# Patient Record
Sex: FEMALE | Race: WHITE
[De-identification: names, ages, dates, MRNs, and addresses within clinical notes are randomized per-mention and may not be internally consistent; named-entity substitution may affect disease eponyms.]

---

## 2017-01-05 ENCOUNTER — HOSPITAL ENCOUNTER (OUTPATIENT)
Dept: HOSPITAL 47 - LABWHC1 | Age: 22
Discharge: HOME | End: 2017-01-05
Payer: COMMERCIAL

## 2017-01-05 DIAGNOSIS — N91.2: Primary | ICD-10-CM

## 2017-01-05 PROCEDURE — 36415 COLL VENOUS BLD VENIPUNCTURE: CPT

## 2017-01-05 PROCEDURE — 84702 CHORIONIC GONADOTROPIN TEST: CPT

## 2017-07-27 ENCOUNTER — HOSPITAL ENCOUNTER (EMERGENCY)
Dept: HOSPITAL 47 - EC | Age: 22
Discharge: HOME | End: 2017-07-27
Payer: COMMERCIAL

## 2017-07-27 VITALS
DIASTOLIC BLOOD PRESSURE: 81 MMHG | TEMPERATURE: 98.7 F | HEART RATE: 97 BPM | SYSTOLIC BLOOD PRESSURE: 122 MMHG | RESPIRATION RATE: 18 BRPM

## 2017-07-27 DIAGNOSIS — F31.9: ICD-10-CM

## 2017-07-27 DIAGNOSIS — F17.200: ICD-10-CM

## 2017-07-27 DIAGNOSIS — F41.9: ICD-10-CM

## 2017-07-27 DIAGNOSIS — Z79.899: ICD-10-CM

## 2017-07-27 DIAGNOSIS — Z88.8: ICD-10-CM

## 2017-07-27 DIAGNOSIS — K02.9: Primary | ICD-10-CM

## 2017-07-27 PROCEDURE — 99283 EMERGENCY DEPT VISIT LOW MDM: CPT

## 2017-07-27 NOTE — ED
ENT HPI





- General


Chief complaint: Dental/Oral


Stated complaint: DENTAL PAIN


Time Seen by Provider: 07/27/17 18:22


Source: patient, RN notes reviewed, old records reviewed


Mode of arrival: ambulatory


Limitations: no limitations





- History of Present Illness


Initial comments: 





20-year-old female presents the ED chief complaint of bilateral lower dental 

pain and upper dental pain for the past 5 days.  Patient has a history of 

severely poor dentition.  She reports that her family never told her pressure 

teeth.  She does have partial implant in her upper teeth.  She does plan to see 

a dentist but has not had one in the area.  She is a smoker.  She denies any 

fever or chills.  She denies any other associated symptoms.  She reports she 

has been taking Motrin and Tylenol for pain but no relief.





- Related Data


 Home Medications











 Medication  Instructions  Recorded  Confirmed


 


Dextroamphetamine/Amphetamine 20 mg PO DAILY 11/05/16 12/17/16





[Adderall]   


 


Ibuprofen [Motrin] 600 mg PO Q8HR PRN 11/05/16 12/17/16


 


clonazePAM [KlonoPIN] 0.5 mg PO HS 07/27/17 07/27/17








 Previous Rx's











 Medication  Instructions  Recorded


 


Penicillin V Potassium [Pen Vee K] 500 mg PO QID #40 tab 07/27/17


 


traMADol HCl [Ultram] 50 mg PO Q6H PRN #12 tab 07/27/17











 Allergies











Allergy/AdvReac Type Severity Reaction Status Date / Time


 


resperdone AdvReac  Unknown Uncoded 11/02/16 00:14














Review of Systems


ROS Statement: 


Those systems with pertinent positive or pertinent negative responses have been 

documented in the HPI.





ROS Other: All systems not noted in ROS Statement are negative.





Past Medical History


Past Medical History: No Reported History


History of Any Multi-Drug Resistant Organisms: None Reported


Past Surgical History: No Surgical Hx Reported


Additional Past Surgical History / Comment(s): oral


Past Anesthesia/Blood Transfusion Reactions: No Reported Reaction


Past Psychological History: Anxiety, Bipolar


Smoking Status: Current every day smoker


Past Alcohol Use History: Rare


Past Drug Use History: None Reported





General Exam





- General Exam Comments


Initial Comments: 





20-year-old female.  No acute distress.


Limitations: no limitations


General appearance: alert, in no apparent distress


Head exam: Present: atraumatic, normocephalic, normal inspection


Eye exam: Present: normal appearance, PERRL, EOMI.  Absent: scleral icterus, 

conjunctival injection, periorbital swelling


ENT exam: Present: mucous membranes moist.  Absent: normal exam, normal 

oropharynx (Patient has severely poor dentition.  Multiple dental caries 

throughout all the teeth.  She does have an implant for teeth 7 through 10.)


Neck exam: Present: normal inspection.  Absent: tenderness, meningismus, 

lymphadenopathy


Respiratory exam: Present: normal lung sounds bilaterally.  Absent: respiratory 

distress, wheezes, rales, rhonchi, stridor


Cardiovascular Exam: Present: regular rate, normal rhythm, normal heart sounds.

  Absent: systolic murmur, diastolic murmur, rubs, gallop, clicks


GI/Abdominal exam: Present: soft, normal bowel sounds.  Absent: distended, 

tenderness, guarding, rebound, rigid


Extremities exam: Present: normal inspection, full ROM, normal capillary 

refill.  Absent: tenderness, pedal edema, joint swelling, calf tenderness


Back exam: Present: normal inspection


Neurological exam: Present: alert, oriented X3, CN II-XII intact


Psychiatric exam: Present: normal affect, normal mood


Skin exam: Present: warm, dry, intact, normal color.  Absent: rash





Course





 Vital Signs











  07/27/17





  18:21


 


Temperature 98.7 F


 


Pulse Rate 97


 


Respiratory 18





Rate 


 


Blood Pressure 122/81


 


O2 Sat by Pulse 96





Oximetry 














Medical Decision Making





- Medical Decision Making


20-year-old female presents the ED chief complaint of bilateral lower dental 

pain and upper dental pain for the past 5 days.  Patient has a history of 

severely poor dentition.  She reports that her family never told her pressure 

teeth.  She does have partial implant in her upper teeth.  Patient has severely 

poor dentition throughout her entire mouth.  She has an implant on teeth #7 

through 10.  Discussed with patient reports a dental hygienist brushing her 

teeth and flossing.  All her teeth are rotted decayed owing up with her dentist 

and will be given referrals for dentists around here.  Patient agrees to 

treatment plan will comply.  Return parameters were discussed.








Disposition


Clinical Impression: 


 Dental caries, Pain, dental





Disposition: HOME SELF-CARE


Condition: Good


Instructions:  Dental Caries (ED), Toothache (ED)


Additional Instructions: 


Patient advised to follow-up with dental clinic.  Take antibiotics and 

recommended mouth rinses and brushing her teeth.





[facility]





[facility] [place] [address]





[phone]  [phone] (existing clients only)





For new clients: [phone]





1st consult: $50 (includes Xrays)





Usually 30% less then private dentist for visits after. 





[facility]





Have to pay $50 for Xrays anmd rest is covered.





[phone]


Prescriptions: 


Penicillin V Potassium [Pen Vee K] 500 mg PO QID #40 tab


traMADol HCl [Ultram] 50 mg PO Q6H PRN #12 tab


 PRN Reason: Pain


Referrals: 


Florentin Milligan MD [Primary Care Provider] - 1-2 days


Time of Disposition: 18:36

## 2017-09-14 ENCOUNTER — HOSPITAL ENCOUNTER (EMERGENCY)
Dept: HOSPITAL 47 - EC | Age: 22
Discharge: HOME | End: 2017-09-14
Payer: COMMERCIAL

## 2017-09-14 VITALS
RESPIRATION RATE: 16 BRPM | SYSTOLIC BLOOD PRESSURE: 127 MMHG | TEMPERATURE: 96.9 F | DIASTOLIC BLOOD PRESSURE: 77 MMHG | HEART RATE: 102 BPM

## 2017-09-14 DIAGNOSIS — F41.9: ICD-10-CM

## 2017-09-14 DIAGNOSIS — K08.89: Primary | ICD-10-CM

## 2017-09-14 DIAGNOSIS — Z79.899: ICD-10-CM

## 2017-09-14 DIAGNOSIS — K08.409: ICD-10-CM

## 2017-09-14 DIAGNOSIS — Z98.890: ICD-10-CM

## 2017-09-14 DIAGNOSIS — F17.200: ICD-10-CM

## 2017-09-14 DIAGNOSIS — Z88.8: ICD-10-CM

## 2017-09-14 PROCEDURE — 99282 EMERGENCY DEPT VISIT SF MDM: CPT

## 2017-09-14 NOTE — ED
ENT HPI





- General


Chief complaint: Dental/Oral


Stated complaint: Tooth Ache


Time Seen by Provider: 09/14/17 10:17


Source: patient


Mode of arrival: ambulatory


Limitations: no limitations





- History of Present Illness


Initial comments: 





Years old female presented with right upper jaw dental pain and gum pain, it 

started at 10 PM last night she has ongoing dental issues for years she has 

lost multiple teeth and more than half of her teeth have major dental disease.  

She denies any fever no chills no trauma to the affected area at this point.  

Review of system is negative otherwise





- Related Data


 Home Medications











 Medication  Instructions  Recorded  Confirmed


 


Dextroamphetamine/Amphetamine 20 mg PO DAILY 11/05/16 07/27/17





[Adderall]   


 


Ibuprofen [Motrin] 600 mg PO Q8HR PRN 11/05/16 07/27/17


 


clonazePAM [KlonoPIN] 0.5 mg PO HS 07/27/17 07/27/17








 Previous Rx's











 Medication  Instructions  Recorded


 


Penicillin V Potassium [Pen Vee K] 500 mg PO QID #40 tab 07/27/17


 


traMADol HCl [Ultram] 50 mg PO Q6H PRN #12 tab 07/27/17


 


Acetaminophen-Codeine 300-30mg 2 tab PO Q8H PRN #30 tablet 09/14/17





[Tylenol #3]  


 


Amoxicillin 500 mg PO Q8H #30 capsule 09/14/17











 Allergies











Allergy/AdvReac Type Severity Reaction Status Date / Time


 


resperdone AdvReac  Unknown Uncoded 09/14/17 10:08














Review of Systems


ROS Statement: 


Those systems with pertinent positive or pertinent negative responses have been 

documented in the HPI.





ROS Other: All systems not noted in ROS Statement are negative.





Past Medical History


Past Medical History: No Reported History


History of Any Multi-Drug Resistant Organisms: None Reported


Past Surgical History: No Surgical Hx Reported


Additional Past Surgical History / Comment(s): oral


Past Anesthesia/Blood Transfusion Reactions: No Reported Reaction


Past Psychological History: Anxiety, Bipolar


Smoking Status: Current every day smoker


Past Alcohol Use History: Rare


Past Drug Use History: None Reported





General Exam





- General Exam Comments


Initial Comments: 





General:  The patient is awake and alert, in no distress, and does not appear 

acutely ill. 


Skin:  Skin is warm and dry and no rashes or lesions are noted. 


Eye:  Pupils are equal, round and reactive to light, extra-ocular movements are 

intact; there is normal conjunctiva bilaterally.  


Ears, nose, mouth and throat:  Examination of the gums and no noticed or 

inflammation of the gums at tooth #1 and 2 his right upper jaw in a more area, 

she is missing tooth 789 and 10 


Neck:  The neck is supple, there is no tenderness  or JVD.  


Cardiovascular:  There is a regular rate and rhythm. No murmur, rub or gallop 

is appreciated.


Respiratory: To auscultation bilateral, no wheezing no rhonchi no distress  

respiratory wise noticed


Gastrointestinal:  Soft, non-distended, non-tender abdomen without masses or 

organomegaly noted. There is no rebound or guarding present. Bowel sounds are 

unremarkable. 


Back:  There is no tenderness to palpation in the midline. There is no obvious 

deformity.


Musculoskeletal:  Normal ROM, no tenderness, There is no pedal edema. There is 

no calf tenderness or swelling. No cords were appreciated.  


Neurological:  CN II-XII intact, Cranial nerves III through XII are intact. 

There are no obvious motor or sensory deficits. Coordination appears grossly 

intact. Speech is normal.


Psychiatric:  Cooperative, appropriate mood & affect, normal judgment.  





Limitations: no limitations





Course





 Vital Signs











  09/14/17





  10:05


 


Temperature 96.9 F L


 


Pulse Rate 102 H


 


Respiratory 16





Rate 


 


Blood Pressure 127/77


 


O2 Sat by Pulse 100





Oximetry 














Disposition


Clinical Impression: 


 Pain, dental





Disposition: HOME SELF-CARE


Condition: Good


Instructions:  Toothache (ED)


Additional Instructions: 


She is advised to see dentist as soon as possible


Prescriptions: 


Acetaminophen-Codeine 300-30mg [Tylenol #3] 2 tab PO Q8H PRN #30 tablet


 PRN Reason: Pain


Amoxicillin 500 mg PO Q8H #30 capsule


Referrals: 


Florentin Milligan MD [Primary Care Provider] - 1-2 days

## 2017-10-02 ENCOUNTER — HOSPITAL ENCOUNTER (OUTPATIENT)
Dept: HOSPITAL 47 - RADXRMAIN | Age: 22
Discharge: HOME | End: 2017-10-02
Payer: COMMERCIAL

## 2017-10-02 DIAGNOSIS — M79.641: Primary | ICD-10-CM

## 2017-10-02 NOTE — XR
EXAMINATION TYPE: XR hand complete RT

 

DATE OF EXAM: 10/2/2017

 

COMPARISON: NONE

 

HISTORY: Pain

 

TECHNIQUE: Three views are submitted.

 

FINDINGS:

The osseous structures are intact.  The joint spaces are preserved and there is no acute fracture or 
dislocation.  

 

IMPRESSION:

1.  No definite acute fracture or dislocation if symptoms persist, follow-up study in 7 to 10 days wo
uld be suggested

## 2018-02-10 ENCOUNTER — HOSPITAL ENCOUNTER (EMERGENCY)
Dept: HOSPITAL 47 - EC | Age: 23
Discharge: HOME | End: 2018-02-10
Payer: COMMERCIAL

## 2018-02-10 VITALS — DIASTOLIC BLOOD PRESSURE: 70 MMHG | HEART RATE: 92 BPM | RESPIRATION RATE: 18 BRPM | SYSTOLIC BLOOD PRESSURE: 120 MMHG

## 2018-02-10 VITALS — TEMPERATURE: 98.1 F

## 2018-02-10 DIAGNOSIS — Z3A.00: ICD-10-CM

## 2018-02-10 DIAGNOSIS — F41.9: ICD-10-CM

## 2018-02-10 DIAGNOSIS — K02.9: ICD-10-CM

## 2018-02-10 DIAGNOSIS — F17.200: ICD-10-CM

## 2018-02-10 DIAGNOSIS — O99.613: Primary | ICD-10-CM

## 2018-02-10 DIAGNOSIS — K04.7: ICD-10-CM

## 2018-02-10 DIAGNOSIS — Z79.899: ICD-10-CM

## 2018-02-10 DIAGNOSIS — O99.343: ICD-10-CM

## 2018-02-10 DIAGNOSIS — Z88.8: ICD-10-CM

## 2018-02-10 DIAGNOSIS — Z79.82: ICD-10-CM

## 2018-02-10 DIAGNOSIS — O99.333: ICD-10-CM

## 2018-02-10 PROCEDURE — 99283 EMERGENCY DEPT VISIT LOW MDM: CPT

## 2018-02-10 NOTE — ED
ENT HPI





- General


Chief complaint: Dental/Oral


Stated complaint: Tooth Ache/Facial Swelling


Time Seen by Provider: 02/10/18 13:23


Source: patient


Mode of arrival: ambulatory


Limitations: no limitations





- History of Present Illness


Initial comments: 


23-year-old female patient presents to the emergency department today for 

complaints of right upper dental pain.  Patient states that she has had dental 

pain with the last 3 days.  States that today she started have swelling to the 

right side of her face.  Patient states that she has many cavities and broken 

teeth to the area.  States that she needs to have her teeth removed however she 

is pregnant and they will not remove them.  She states she has been taking 

Tylenol for the pain.  She denies any fevers or chills.  Denies any nausea or 

vomiting.  Denies any difficulty swallowing. Patient denies any recent rash, 

shortness breath, chest pain, abdominal pain, diarrhea, constipation, back pain

, numbness, tingling, dizziness, weakness, hematuria, dysuria, urinary urgency, 

urinary frequency, headache, visual changes, or any other complaints.  Patient 

states she is 7 months pregnant and has been feeling normal fetal movement.








- Related Data


 Home Medications











 Medication  Instructions  Recorded  Confirmed


 


Dextroamphetamine/Amphetamine 20 mg PO DAILY 11/05/16 09/14/17





[Adderall]   


 


Ibuprofen [Motrin] 600 mg PO Q8HR PRN 11/05/16 09/14/17


 


clonazePAM [KlonoPIN] 0.5 mg PO HS 07/27/17 09/14/17


 


Anti-Depressant Unknown 1 tab PO DAILY 09/14/17 09/14/17


 


Aspirin 650 mg PO DAILY 09/14/17 09/14/17








 Previous Rx's











 Medication  Instructions  Recorded


 


Acetaminophen-Codeine 300-30mg 2 tab PO Q8H PRN #30 tablet 09/14/17





[Tylenol #3]  


 


Amoxicillin 500 mg PO Q8H #30 capsule 09/14/17


 


Penicillin V Potassium [Pen Vee K] 500 mg PO Q6H #40 tablet 02/10/18











 Allergies











Allergy/AdvReac Type Severity Reaction Status Date / Time


 


risperidone Allergy  Unknown Verified 02/10/18 13:09














Review of Systems


ROS Statement: 


Those systems with pertinent positive or pertinent negative responses have been 

documented in the HPI.





ROS Other: All systems not noted in ROS Statement are negative.





Past Medical History


Past Medical History: No Reported History


History of Any Multi-Drug Resistant Organisms: None Reported


Past Surgical History: No Surgical Hx Reported


Additional Past Surgical History / Comment(s): oral


Past Anesthesia/Blood Transfusion Reactions: No Reported Reaction


Past Psychological History: Anxiety, Bipolar


Smoking Status: Current every day smoker


Past Alcohol Use History: Rare


Past Drug Use History: None Reported





General Exam


Limitations: no limitations


General appearance: alert, in no apparent distress, other (This is a well-

developed, well-nourished adult female patient in no acute distress.  Vital 

signs upon presentation are temperature 98.1F, pulse 109, respirations 20, 

blood pressure 118/78, pulse ox 98% on room air.)


Eye exam: Present: normal appearance, PERRL, EOMI.  Absent: scleral icterus, 

conjunctival injection, periorbital swelling


ENT exam: Present: mucous membranes moist, other (Dentition is very poor with 

multiple cavities, right upper teeth are broken and did show extensive dental 

caries.  There is surrounding gingival erythema, right facial swelling.  No 

evidence of drainable abscess.  No cervical lymphadenopathy.).  Absent: normal 

exam


Neck exam: Present: normal inspection.  Absent: tenderness, meningismus, 

lymphadenopathy


Respiratory exam: Present: normal lung sounds bilaterally.  Absent: respiratory 

distress, wheezes, rales, rhonchi, stridor


Cardiovascular Exam: Present: regular rate, normal rhythm, normal heart sounds.

  Absent: systolic murmur, diastolic murmur, rubs, gallop, clicks





Course


 Vital Signs











  02/10/18





  13:06


 


Temperature 98.1 F


 


Pulse Rate 109 H


 


Respiratory 20





Rate 


 


Blood Pressure 118/78


 


O2 Sat by Pulse 98





Oximetry 














Medical Decision Making





- Medical Decision Making


23-year-old female patient percents to the emergency department today with 

complaint of right upper dental pain and facial swelling.  Physical examination 

did reveal extensive dental caries with gingival erythema.  No evidence of 

drainable abscess.  Patient is 7 months pregnant.  Fetal heart tones were 157, 

performed by myself.  Vital signs are stable.  Patient be discharged with a 

prescription for penicillin.  She is instructed to take Tylenol for pain 

control.  She is instructed to do saltwater gargles and swishes.  She is 

instructed to apply warm compresses to the outside of her face.  She is 

instructed to follow-up with dentistry as soon as possible.  She is instructed 

to return here immediately for any new, worsening, or concerning symptoms.  She 

verbalizes understanding and agrees with this plan.








Disposition


Clinical Impression: 


 Dental abscess





Disposition: HOME SELF-CARE


Condition: Good


Instructions:  Dental Abscess (ED), Toothache (ED)


Additional Instructions: 


Swish with warm salt water.  Use warm compresses to the outside of the face.  

Complete antibiotic prescription in full.  Follow-up with the dentist as soon 

as possible.  Return here immediately for any new, worsening, or concerning 

symptoms.


Prescriptions: 


Penicillin V Potassium [Pen Vee K] 500 mg PO Q6H #40 tablet


Referrals: 


None,Stated [Primary Care Provider] - 1-2 days


Time of Disposition: 13:25

## 2018-02-21 ENCOUNTER — HOSPITAL ENCOUNTER (OUTPATIENT)
Dept: HOSPITAL 47 - LABWHC1 | Age: 23
Discharge: HOME | End: 2018-02-21
Payer: COMMERCIAL

## 2018-02-21 DIAGNOSIS — Z34.82: Primary | ICD-10-CM

## 2018-02-21 DIAGNOSIS — Z3A.00: ICD-10-CM

## 2018-02-21 LAB
ERYTHROCYTE [DISTWIDTH] IN BLOOD BY AUTOMATED COUNT: 3.49 M/UL (ref 3.8–5.4)
ERYTHROCYTE [DISTWIDTH] IN BLOOD: 12.1 % (ref 11.5–15.5)
HCT VFR BLD AUTO: 34.2 % (ref 34–46)
HGB BLD-MCNC: 10.7 GM/DL (ref 11.4–16)
MCH RBC QN AUTO: 30.6 PG (ref 25–35)
MCHC RBC AUTO-ENTMCNC: 31.2 G/DL (ref 31–37)
MCV RBC AUTO: 98.1 FL (ref 80–100)
PLATELET # BLD AUTO: 265 K/UL (ref 150–450)
WBC # BLD AUTO: 17.7 K/UL (ref 3.8–10.6)

## 2018-02-21 PROCEDURE — 82950 GLUCOSE TEST: CPT

## 2018-02-21 PROCEDURE — 36415 COLL VENOUS BLD VENIPUNCTURE: CPT

## 2018-02-21 PROCEDURE — 85027 COMPLETE CBC AUTOMATED: CPT

## 2018-03-14 ENCOUNTER — HOSPITAL ENCOUNTER (OUTPATIENT)
Dept: HOSPITAL 47 - FBPOP | Age: 23
Discharge: HOME | End: 2018-03-14
Payer: COMMERCIAL

## 2018-03-14 VITALS
DIASTOLIC BLOOD PRESSURE: 74 MMHG | RESPIRATION RATE: 16 BRPM | TEMPERATURE: 97.8 F | SYSTOLIC BLOOD PRESSURE: 129 MMHG | HEART RATE: 96 BPM

## 2018-03-14 DIAGNOSIS — Z3A.32: ICD-10-CM

## 2018-03-14 DIAGNOSIS — O26.93: Primary | ICD-10-CM

## 2018-03-14 DIAGNOSIS — O99.333: ICD-10-CM

## 2018-03-14 LAB
PH UR: 6.5 [PH] (ref 5–8)
RBC UR QL: 1 /HPF (ref 0–5)
SP GR UR: 1.02 (ref 1–1.03)
SQUAMOUS UR QL AUTO: 2 /HPF (ref 0–4)
UROBILINOGEN UR QL STRIP: 3 MG/DL (ref ?–2)
WBC #/AREA URNS HPF: 3 /HPF (ref 0–5)

## 2018-03-14 PROCEDURE — 59025 FETAL NON-STRESS TEST: CPT

## 2018-03-14 PROCEDURE — 99213 OFFICE O/P EST LOW 20 MIN: CPT

## 2018-03-14 PROCEDURE — 81001 URINALYSIS AUTO W/SCOPE: CPT

## 2018-03-14 NOTE — P.MSEPDOC
Presenting Problems





- Arrival Data


Date of Arrival on Unit: 18


Time of Arrival on Unit: 15:00


Mode of Transport: Ambulatory





- Complaint


OB-Reason for Admission/Chief Complaint: Pain


Comment: Suprapubic and flank pain





Prenatal Medical History





- Pregnancy Information


: 2


Para: 1


Term: 1


: 0


Abortions: Spontaneous or Elective: 0


Number of Living Children: 1





- Gestational Age


Gestational Age by MONIQUE (wks/days): 32 Weeks and 5 Days





- Prenatal History


Pregnancy Complications: Smoker





Review of Systems





- Review of Systems


Constitutional: No problems


Breast: No problems


ENT: No problems


Cardiovascular: No problems


Respiratory: No problems


Gastrointestinal: No problems


Genitourinary: Increased frequency


Musculoskeletal: No problems


Neurological: No problems


Skin: No problems





Vital Signs





- Temperature


Temperature: 97.8 F


Temperature Source: Temporal Artery Scan





- Pulse


  ** Right Sitting Brachial


Pulse Rate: 96


Pulse Assessment Method: Pulse Oximetry





- Respirations


Respiratory Rate: 16


Oxygen Delivery Method: Room Air


O2 Sat by Pulse Oximetry: 98





- Blood Pressure


  ** Right Arm Sitting


Blood Pressure: 129/74


Blood Pressure Mean: 92


Blood Pressure Source: Automatic Cuff





Medical Screen Scoring (Pre)





- Cervical Exam


Dilation: 0 cm = 0


Effacement: Exam Deferred


Membranes: Intact





- Uterine Contractions


Frequency: N/A


Duration: N/A


Intensity: N/A





- Maternal Vital Signs


Maternal Temperature: N/A


Maternal Blood Pressure: N/A


Signs of Preeclampsia: N/A


Maternal Respirations: N/A





- Pain Assessment


Pain Location and Character: Lower, Abdomen


Pain Scale Used: Numeric (1 - 10)


Pain Intensity: 8


Pain Management Goal: 2


Pain Description: *Acute, Cramping


Pain Radiation Location: flank


Pain Frequency: Constant


Pain Duration: 48


Pain Duration Units: Hours


Pain Behavior: Vocalization


Pain Aggravating Factors: Activity





- Maternal Trauma


Maternal Trauma: N/A





- Fetal Assessment


Baseline FHR: 130


Fetal Heart Rate - NICHD Category: Category I (Normal) = 0


NST: Reactive


Fetal Position: N/A





- Total Score


Total Score (Pre): 0





- Level of Risk


Level of Risk: Low (0-5)





Physician Notification (Pre)





- Physician Notified


Physician Notified Date: 18


Physician Notified Time: 16:05


Physician/Practitioner Notifed:: Haroon


Spoke With: Haroon


New Order Received: Yes





- Notification Comment


Comment: Spk c\Dr. Patiño, advsd , 32 /, reactive NST, UA results, FFN 

collected, SVE closed/50/-2, firm. Not tacho. Order to edu pt on normal 

pregnancy discomfort, comfort measures. D/C home, to follow up at next 

scheduled appt.





Disposition





- Disposition


OB Disposition: Discharge to home, Written follow up instructions reviewed


Discharge Date: 18


Discharge Time: 16:17


I agree with the RN Medical Screening Exam: Yes


Risk & Benefit of care provided described in d/c instruction: Yes


Diagnosis: PREGNANCY RELATED CONDITIONS, UNSPECIFIED, THIRD TRIMESTER (pain 

consistent with fetal movement)

## 2018-03-16 ENCOUNTER — HOSPITAL ENCOUNTER (OUTPATIENT)
Dept: HOSPITAL 47 - FBPOP | Age: 23
Discharge: HOME | End: 2018-03-16
Payer: COMMERCIAL

## 2018-03-16 VITALS
HEART RATE: 92 BPM | SYSTOLIC BLOOD PRESSURE: 121 MMHG | TEMPERATURE: 97.8 F | RESPIRATION RATE: 16 BRPM | DIASTOLIC BLOOD PRESSURE: 74 MMHG

## 2018-03-16 DIAGNOSIS — M25.552: ICD-10-CM

## 2018-03-16 DIAGNOSIS — O26.893: Primary | ICD-10-CM

## 2018-03-16 DIAGNOSIS — Z3A.33: ICD-10-CM

## 2018-03-16 PROCEDURE — 99213 OFFICE O/P EST LOW 20 MIN: CPT

## 2018-03-16 PROCEDURE — 59025 FETAL NON-STRESS TEST: CPT

## 2018-03-20 NOTE — P.MSEPDOC
Presenting Problems





- Arrival Data


Date of Arrival on Unit: 18


Time of Arrival on Unit: 18:57


Mode of Transport: Wheelchair





- Complaint


OB-Reason for Admission/Chief Complaint: Pain


Comment: pt states cramping throughout the last few days. states it became more 

painful today. c/o hip pain as well.





Prenatal Medical History





- Pregnancy Information


: 2


Para: 1


Term: 1


: 0


Abortions: Spontaneous or Elective: 0


Number of Living Children: 1





- Gestational Age


Gestational Age by MONIQUE (wks/days): 33 Weeks and 0 Days





Review of Systems





- Review of Systems


Constitutional: No problems


Breast: No problems


ENT: No problems


Cardiovascular: No problems


Respiratory: No problems


Gastrointestinal: No problems


Genitourinary: No problems


Musculoskeletal: No problems


Neurological: No problems


Skin: No problems





Vital Signs





- Temperature


Temperature: 97.8 F


Temperature Source: Oral





- Pulse


  ** Right Sitting Brachial


Pulse Rate: 92


Pulse Assessment Method: Automatic Cuff





- Respirations


Respiratory Rate: 16


Oxygen Delivery Method: Room Air


O2 Sat by Pulse Oximetry: 98





- Blood Pressure


  ** Right Arm Sitting


Blood Pressure: 121/74


Blood Pressure Mean: 89


Blood Pressure Source: Automatic Cuff





Medical Screen Scoring (Pre)





- Cervical Exam


Dilation: 0 cm = 0


Membranes: Intact





- Maternal Vital Signs


Maternal Blood Pressure: N/A


Signs of Preeclampsia: N/A


Maternal Respirations: N/A





- Pain Assessment


Pain Location and Character: Medial, Abdomen


Pain Scale Used: Numeric (1 - 10)


Pain Intensity: 9


Pain Management Goal: 3


Pain Description: Cramping


Pain Radiation Location: N/A


Pain Frequency: Intermittent


Pain Duration: 3


Pain Duration Units: Days


Pain Behavior: Vocalization


Pain Aggravating Factors: Activity, Contractions





- Fetal Assessment


Baseline FHR: 125


Fetal Heart Rate - NICHD Category: Category I (Normal) = 0


NST: Reactive





- Total Score


Total Score (Pre): 0





- Level of Risk


Level of Risk: Low (0-5)





Physician Notification (Pre)





- Physician Notified


Physician Notified Date: 18


Physician Notified Time: 19:50


Physician/Practitioner Notifed:: SVITLANA


Spoke With: SVITLANA


New Order Received: Yes





- Notification Comment


Comment: collect FFN, check cervix, if closed, d/c home. If dilated, send FFN





Disposition





- Disposition


OB Disposition: Discharge to home


Discharge Date: 18


Discharge Time: 20:10


I agree with the RN Medical Screening Exam: Yes


Risk & Benefit of care provided described in d/c instruction: Yes


Diagnosis: PAIN IN LEFT HIP (pregnancy related)

## 2018-04-02 ENCOUNTER — HOSPITAL ENCOUNTER (OUTPATIENT)
Dept: HOSPITAL 47 - FBPOP | Age: 23
Discharge: HOME | End: 2018-04-02
Payer: COMMERCIAL

## 2018-04-02 VITALS
DIASTOLIC BLOOD PRESSURE: 73 MMHG | TEMPERATURE: 98 F | HEART RATE: 96 BPM | SYSTOLIC BLOOD PRESSURE: 115 MMHG | RESPIRATION RATE: 16 BRPM

## 2018-04-02 DIAGNOSIS — R10.9: ICD-10-CM

## 2018-04-02 DIAGNOSIS — Z3A.35: ICD-10-CM

## 2018-04-02 DIAGNOSIS — O99.89: Primary | ICD-10-CM

## 2018-04-02 PROCEDURE — 59025 FETAL NON-STRESS TEST: CPT

## 2018-04-02 PROCEDURE — 84112 EVAL AMNIOTIC FLUID PROTEIN: CPT

## 2018-04-02 PROCEDURE — 99213 OFFICE O/P EST LOW 20 MIN: CPT

## 2018-04-02 NOTE — P.MSEPDOC
Presenting Problems





- Arrival Data


Date of Arrival on Unit: 18


Time of Arrival on Unit: 19:10


Mode of Transport: Ambulatory





- Complaint


OB-Reason for Admission/Chief Complaint: Rule Out SROM, Other


Comment: leaking of clear/yellow fluid since yesterday, abdominal cramping for 

two days, chest heaviness for a week.





Prenatal Medical History





- Pregnancy Information


: 2


Para: 1


Term: 1


: 0


Abortions: Spontaneous or Elective: 0


Number of Living Children: 1





- Gestational Age


Gestational Age by MONIQUE (wks/days): 35 Weeks and 3 Days





- Prenatal History


Pregnancy Complications: Prior 





Review of Systems





- Review of Systems


Constitutional: No problems


Breast: No problems


ENT: No problems


Cardiovascular: Chest pain


Respiratory: No problems


Gastrointestinal: No problems


Genitourinary: No problems


Musculoskeletal: No problems


Neurological: No problems


Skin: No problems





Vital Signs





- Temperature


Temperature: 98.0 F


Temperature Source: Temporal Artery Scan





- Pulse


  ** Right Sitting Brachial


Pulse Rate: 96


Pulse Assessment Method: Automatic Cuff





- Respirations


Respiratory Rate: 16


Oxygen Delivery Method: Room Air


O2 Sat by Pulse Oximetry: 97





- Blood Pressure


  ** Right Arm Sitting


Blood Pressure: 115/73


Blood Pressure Mean: 87


Blood Pressure Source: Automatic Cuff





Medical Screen Scoring (Pre)





- Cervical Exam


Dilation: 0 cm = 0


Membranes: Intact





- Uterine Contractions


Frequency: N/A


Duration: N/A


Intensity: N/A





- Maternal Vital Signs


Maternal Temperature: N/A


Maternal Blood Pressure: N/A


Signs of Preeclampsia: N/A


Maternal Respirations: N/A





- Pain Assessment


Pain Location and Character: Lower, Abdomen


Pain Scale Used: Numeric (1 - 10)


Pain Intensity: 8


Pain Management Goal: 3


Pain Description: Cramping


Pain Radiation Location: n/a


Pain Frequency: Intermittent


Pain Duration: 2


Pain Duration Units: Days


Pain Behavior: None Exhibited, Vocalization


Pain Aggravating Factors: Activity





- Fetal Assessment


Baseline FHR: 130


Fetal Heart Rate - NICHD Category: Category I (Normal) = 0


NST: Reactive


Fetal Position: N/A


Fetal Station: N/A





- Total Score


Total Score (Pre): 0





- Level of Risk


Level of Risk: Low (0-5)





Physician Notification (Pre)





- Physician Notified


Physician Notified Date: 18


Physician/Practitioner Notifed:: noble


Spoke With: noble





Physician Notification (Post)





- Physician Notified


Physician Notified Date: 18


Physician Notified Time: 19:47


Physician/Practitioner Notified:: noble


Spoke With: noble


New Order Received: Yes





- Notification Comment


Comment: d/c pt home.





Disposition





- Disposition


OB Disposition: Discharge to home


Discharge Date: 18


Discharge Time: 19:51


I agree with the RN Medical Screening Exam: Yes


Risk & Benefit of care provided described in d/c instruction: Yes


Diagnosis: PAIN, UNSPECIFIED

## 2018-04-04 ENCOUNTER — HOSPITAL ENCOUNTER (EMERGENCY)
Dept: HOSPITAL 47 - EC | Age: 23
Discharge: HOME | End: 2018-04-04
Payer: COMMERCIAL

## 2018-04-04 VITALS
RESPIRATION RATE: 18 BRPM | SYSTOLIC BLOOD PRESSURE: 127 MMHG | TEMPERATURE: 98 F | HEART RATE: 69 BPM | DIASTOLIC BLOOD PRESSURE: 83 MMHG

## 2018-04-04 DIAGNOSIS — Z88.8: ICD-10-CM

## 2018-04-04 DIAGNOSIS — F17.200: ICD-10-CM

## 2018-04-04 DIAGNOSIS — Z3A.36: ICD-10-CM

## 2018-04-04 DIAGNOSIS — O99.613: Primary | ICD-10-CM

## 2018-04-04 DIAGNOSIS — O99.333: ICD-10-CM

## 2018-04-04 DIAGNOSIS — K04.7: ICD-10-CM

## 2018-04-04 PROCEDURE — 99282 EMERGENCY DEPT VISIT SF MDM: CPT

## 2018-04-04 NOTE — ED
General Adult HPI





- General


Chief complaint: Dental/Oral


Stated complaint: Toothache


Time Seen by Provider: 04/04/18 18:18


Source: patient, RN notes reviewed


Mode of arrival: ambulatory


Limitations: no limitations





- History of Present Illness


Initial comments: 





Patient 23-year-old female presented to the emergency room today with chief 

complaint of increased dental pain.  Patient does not that she's 36 weeks 

pregnant.  She denies any abdominal pain, vaginal bleeding or discharge.  

Patient states that she noticed some pain to the right upper gumline started 

yesterday.  She does admit some pain locally around tooth #7.  Patient denies 

any other complaints or symptoms at this time. Patient denies any recent fever, 

chills, shortness of breath, chest pain, back pain, abdominal pain, headaches 

or visual changes, or any other complaints.





- Related Data


 Home Medications











 Medication  Instructions  Recorded  Confirmed


 


Pnv,Calcium 72/Iron/Folic Acid 1 tab PO DAILY 03/14/18 04/02/18





[Prenatal Plus Tablet]   








 Previous Rx's











 Medication  Instructions  Recorded


 


Penicillin V Potassium [Pen Vee K] 500 mg PO QID #40 tablet 04/04/18











 Allergies











Allergy/AdvReac Type Severity Reaction Status Date / Time


 


risperidone Allergy  Unknown Verified 04/04/18 18:17














Review of Systems


ROS Statement: 


Those systems with pertinent positive or pertinent negative responses have been 

documented in the HPI.





ROS Other: All systems not noted in ROS Statement are negative.





Past Medical History


Past Medical History: No Reported History


History of Any Multi-Drug Resistant Organisms: None Reported


Past Surgical History: No Surgical Hx Reported


Additional Past Surgical History / Comment(s): oral


Past Anesthesia/Blood Transfusion Reactions: No Reported Reaction


Past Psychological History: Anxiety, Bipolar


Smoking Status: Current every day smoker





General Exam





- General Exam Comments


Initial Comments: 





General:  The patient is awake and alert, in no distress, and does not appear 

acutely ill. 


Eye:  Pupils are equal, round and reactive to light, extra-ocular movements are 

intact.  No nystagmus.  There is normal conjunctiva bilaterally.  No signs of 

icterus.  


Ears, nose, mouth and throat:  There are moist mucous membranes and no oral 

lesions.  She does have missing tooth #8 #9.  She is locally tender in the 

gumline above tooth #7.  No sign of abscess.  Some swelling to the gumline.  No 

drainage or discharge.


Neck:  The neck is supple, there is no tenderness or JVD.  


Musculoskeletal:  Normal ROM, no tenderness.  Strength 5/5. Sensation intact. 

Pulses equal bilaterally 2+.  


Neurological:  A&O x 3. CN II-XII intact, There are no obvious motor or sensory 

deficits. Coordination appears grossly intact. Speech is normal.


Skin:  Skin is warm and dry and no rashes or lesions are noted. 


Psychiatric:  Cooperative, appropriate mood & affect, normal judgment.  


Limitations: no limitations





Course





 Vital Signs











  04/04/18





  18:15


 


Temperature 98 F


 


Pulse Rate 69


 


Respiratory 18





Rate 


 


Blood Pressure 127/83


 


O2 Sat by Pulse 98





Oximetry 














Medical Decision Making





- Medical Decision Making





She'll be started on antibiotics of penicillin.  Patient is advised follow-up 

with her dentist.  Advised return if any symptoms increase worsen.  





Disposition


Clinical Impression: 


 Dental abscess





Disposition: HOME SELF-CARE


Condition: Good


Instructions:  Dental Abscess (ED)


Additional Instructions: 


Please use medication as discussed.  Please follow-up with dentist/family 

doctor in the next 2 days of symptoms have not improved.  Please return to 

emergency room if the symptoms increase or worsen or for any other concerns.


Prescriptions: 


Penicillin V Potassium [Pen Vee K] 500 mg PO QID #40 tablet


Referrals: 


None,Stated [Primary Care Provider] - 1-2 days


Time of Disposition: 18:27

## 2018-04-05 NOTE — CDI
Documentation Clarification OP

Dear Jadon Garza PA-C

Please provide proper documentation about pregnancy.

Thank you,

CAREY Alex 

 

If you have any questions, please contact  at [phone] 
[facility]

## 2018-04-12 ENCOUNTER — HOSPITAL ENCOUNTER (OUTPATIENT)
Dept: HOSPITAL 47 - FBPOP | Age: 23
Discharge: HOME | End: 2018-04-12
Payer: COMMERCIAL

## 2018-04-12 VITALS
HEART RATE: 121 BPM | RESPIRATION RATE: 18 BRPM | TEMPERATURE: 97.9 F | DIASTOLIC BLOOD PRESSURE: 80 MMHG | SYSTOLIC BLOOD PRESSURE: 126 MMHG

## 2018-04-12 DIAGNOSIS — O47.03: Primary | ICD-10-CM

## 2018-04-12 DIAGNOSIS — Z3A.36: ICD-10-CM

## 2018-04-12 PROCEDURE — 99213 OFFICE O/P EST LOW 20 MIN: CPT

## 2018-04-12 PROCEDURE — 59025 FETAL NON-STRESS TEST: CPT

## 2018-04-16 NOTE — P.MSEPDOC
Presenting Problems





- Arrival Data


Date of Arrival on Unit: 18


Time of Arrival on Unit: 14:55


Mode of Transport: Wheelchair





- Complaint


OB-Reason for Admission/Chief Complaint: Possible Onset of Labor





Prenatal Medical History





- Pregnancy Information


: 2


Para: 1


Term: 1


: 0


Abortions: Spontaneous or Elective: 0


Number of Living Children: 1





- Gestational Age


Gestational Age by MONIQUE (wks/days): 36 Weeks and 6 Days





- Prenatal History


Pregnancy Complications: Prior 





Review of Systems





- Review of Systems


Constitutional: No problems


Breast: No problems


ENT: No problems


Cardiovascular: No problems


Respiratory: No problems


Gastrointestinal: No problems


Genitourinary: No problems


Musculoskeletal: No problems


Neurological: No problems


Skin: No problems





Vital Signs





- Temperature


Temperature: 97.9 F


Temperature Source: Temporal Artery Scan





- Pulse


  ** Brachial


Pulse Rate: 121


Pulse Assessment Method: Automatic Cuff





- Respirations


Respiratory Rate: 18


Oxygen Delivery Method: Room Air





- Blood Pressure


  ** Right Arm


Blood Pressure: 126/80


Blood Pressure Mean: 95


Blood Pressure Source: Automatic Cuff





Medical Screen Scoring (Pre)





- Cervical Exam


Dilation: 0 cm = 0


Membranes: Intact





- Uterine Contractions


Frequency: N/A





- Maternal Vital Signs


Maternal Temperature: N/A


Signs of Preeclampsia: N/A


Maternal Respirations: N/A





- Pain Assessment


Pain Location and Character: Abdomen


Pain Scale Used: Numeric (1 - 10)


Pain Intensity: 9


Pain Description: Cramping


Pain Frequency: Intermittent


Pain Behavior: Vocalization





- Fetal Assessment


Baseline FHR: 135


Fetal Heart Rate - NICHD Category: Category I (Normal) = 0


NST: Reactive





- Total Score


Total Score (Pre): 0





Physician Notification (Pre)





- Physician Notified


Physician Notified Date: 18


Physician Notified Time: 16:05


Physician/Practitioner Notifed:: dr candelaria





Disposition





- Disposition


OB Disposition: Triage, Discharge to home, Written follow up instructions 

reviewed


Discharge Date: 18


Discharge Time: 16:25


I agree with the RN Medical Screening Exam: Yes


Risk & Benefit of care provided described in d/c instruction: Yes


Diagnosis: FALSE LABOR BEFORE 37 COMPLETED WEEKS OF GEST, THIRD TRI

## 2018-04-30 ENCOUNTER — HOSPITAL ENCOUNTER (INPATIENT)
Dept: HOSPITAL 47 - 4FBP | Age: 23
LOS: 2 days | Discharge: HOME | End: 2018-05-02
Payer: COMMERCIAL

## 2018-04-30 VITALS — BODY MASS INDEX: 26.9 KG/M2

## 2018-04-30 DIAGNOSIS — Z88.8: ICD-10-CM

## 2018-04-30 DIAGNOSIS — Z3A.39: ICD-10-CM

## 2018-04-30 DIAGNOSIS — F17.200: ICD-10-CM

## 2018-04-30 DIAGNOSIS — O34.211: Primary | ICD-10-CM

## 2018-04-30 LAB
BASOPHILS # BLD AUTO: 0 K/UL (ref 0–0.2)
BASOPHILS NFR BLD AUTO: 0 %
EOSINOPHIL # BLD AUTO: 0.3 K/UL (ref 0–0.7)
EOSINOPHIL NFR BLD AUTO: 2 %
ERYTHROCYTE [DISTWIDTH] IN BLOOD BY AUTOMATED COUNT: 3.91 M/UL (ref 3.8–5.4)
ERYTHROCYTE [DISTWIDTH] IN BLOOD: 12.1 % (ref 11.5–15.5)
HCT VFR BLD AUTO: 36.3 % (ref 34–46)
HGB BLD-MCNC: 12.3 GM/DL (ref 11.4–16)
LYMPHOCYTES # SPEC AUTO: 3.1 K/UL (ref 1–4.8)
LYMPHOCYTES NFR SPEC AUTO: 24 %
MCH RBC QN AUTO: 31.6 PG (ref 25–35)
MCHC RBC AUTO-ENTMCNC: 34 G/DL (ref 31–37)
MCV RBC AUTO: 92.9 FL (ref 80–100)
MONOCYTES # BLD AUTO: 0.4 K/UL (ref 0–1)
MONOCYTES NFR BLD AUTO: 3 %
NEUTROPHILS # BLD AUTO: 9.1 K/UL (ref 1.3–7.7)
NEUTROPHILS NFR BLD AUTO: 70 %
PLATELET # BLD AUTO: 228 K/UL (ref 150–450)
WBC # BLD AUTO: 13.1 K/UL (ref 3.8–10.6)

## 2018-04-30 PROCEDURE — 88307 TISSUE EXAM BY PATHOLOGIST: CPT

## 2018-04-30 PROCEDURE — 85025 COMPLETE CBC W/AUTO DIFF WBC: CPT

## 2018-04-30 RX ADMIN — KETOROLAC TROMETHAMINE SCH MG: 30 INJECTION, SOLUTION INTRAMUSCULAR at 23:45

## 2018-04-30 RX ADMIN — POTASSIUM CHLORIDE SCH MLS/HR: 14.9 INJECTION, SOLUTION INTRAVENOUS at 06:37

## 2018-04-30 RX ADMIN — POTASSIUM CHLORIDE SCH MLS/HR: 14.9 INJECTION, SOLUTION INTRAVENOUS at 12:51

## 2018-04-30 RX ADMIN — POTASSIUM CHLORIDE SCH: 14.9 INJECTION, SOLUTION INTRAVENOUS at 20:17

## 2018-04-30 RX ADMIN — DOCUSATE SODIUM AND SENNOSIDES SCH: 50; 8.6 TABLET ORAL at 20:18

## 2018-04-30 RX ADMIN — KETOROLAC TROMETHAMINE SCH MG: 30 INJECTION, SOLUTION INTRAMUSCULAR at 14:33

## 2018-04-30 NOTE — P.OP
Date of Procedure: 18


Preoperative Diagnosis: 


Intrauterine pregnancy at term: Prior  section


Postoperative Diagnosis: 


Same


Procedure(s) Performed: 


Repeat low transverse  section


Anesthesia: spinal


Surgeon: Mohan Patiño


Assistant #1: Dominique Hartmann


Estimated Blood Loss (ml): 400


IV fluids (ml): 600


Urine output (ml): 125


Pathology: other (Placenta)


Condition: stable


Disposition: floor


Operative Findings: 


Male  Apgar scores of 8 and 9 at one and 5 minutes respectively and the 

weight was 5 lbs. 13 oz.


Description of Procedure: 


Patient was taken to the operating suite where spinal anesthetic was found be 

adequate.  She was prepped and draped in the normal sterile fashion and placed 

in dorsal supine position with leftward tilt.  Initially a Pfannenstiel skin 

incision was made and this incision was then carried through to underlying 

layer of the fascia with a second knife.  Fascia was then nicked in midline and 

this opening was extended laterally with Almendarez scissors.  Superior and inferior 

aspect of this incision were then grasped tented up and bluntly and sharply 

dissected off the rectus muscles.  Rectus muscles were then divided in the 

midline and sharp dissection through the peritoneum was made.  This opening was 

then extended superiorly and inferiorly with good visualization of both bowel 

bladder.  Bladder blade was then placed and the bladder flap identified and 

entered with Metzenbaum scissors and carried across face uterus with Metzenbaum 

scissors and bluntly dissected out of the operative field.  Knife was then used 

to incise uterus this opening was then fully opened with hemostat and then 

extended bluntly.  Head was then atraumatically delivered and mouth nares were 

bulb suctioned.  Shoulders were easily delivered followed by the remainder the 

baby umbilical cord was then clamped cut usual fashion an nursery personnel was 

present to assume care.  Placenta was then delivered intact and Pitocin was 

added to the IV.  Uterus was then exteriorized cleared of clots and debris and 

closed in 2 layers with 0 Vicryl suture.  Blood and debris was then suctioned 

from the posterior cul-de-sac and uterus was reinserted into the abdomen.  

Peritoneal layer was then closed L[place]ac suture fascial layer was closed with 0 

Vicryl suture a layer of 3-0 Vicryl was placed in the subcuticular tissues and 

the skin was closed with 3-0 Vicryl.  Sponge, lap, needle counts were all 

correct 2.  Patient was then taken to the recovery room in stable and 

satisfactory condition.

## 2018-04-30 NOTE — P.HPOB
History of Present Illness


H&P Date: 18


Chief Complaint: Intrauterine pregnancy at term with prior  section





Patient is a 23-year-old  at 39 weeks gestation dated by 24 week 

ultrasound.  Noted at her late prenatal visit at 21 weeks that she was actually 

3 weeks ahead this ultrasound was verified at 32 weeks.  Her Precis course 

otherwise was unremarkable and she did very well.  Pertinent labs did include O

+ blood type, Rh antibody was negative, rubella immune, hepatitis B surface 

antigen/RPR/GBS all negative.  She did pass her 1 hour Glucola screen.





Past Medical History


Past Medical History: No Reported History


History of Any Multi-Drug Resistant Organisms: None Reported


Past Surgical History: No Surgical Hx Reported


Additional Past Surgical History / Comment(s): oral


Past Anesthesia/Blood Transfusion Reactions: No Reported Reaction


Past Psychological History: Anxiety, Bipolar


Smoking Status: Current every day smoker


Past Alcohol Use History: Rare


Past Drug Use History: None Reported





- Past Family History


  ** Mother


Family Medical History: Congestive Heart Failure (CHF), CVA/TIA, Myocardial 

Infarction (MI)





  ** Father


Family Medical History: Hypertension





Medications and Allergies


 Home Medications











 Medication  Instructions  Recorded  Confirmed  Type


 


Pnv,Calcium 72/Iron/Folic Acid 1 tab PO DAILY 18 History





[Prenatal Plus Tablet]    











 Allergies











Allergy/AdvReac Type Severity Reaction Status Date / Time


 


risperidone Allergy  Unknown Verified 18 06:15














Exam


Osteopathic Statement: *.  No significant issues noted on an osteopathic 

structural exam other than those noted in the History and Physical/Consult.





- Vital Signs


Vital signs: 


 Vital Signs











  Temp Pulse Resp BP Pulse Ox


 


 18 06:14  96.8 F L  84  16  127/83  96








 Intake and Output











 18





 22:59 06:59 14:59


 


Other:   


 


  Weight  71.214 kg 














- OBG Physical Exam


Breast: both: normal (no masses)


Abdomen: bowel sounds normal, no diffuse tenderness, no bruit present, no 

guarding noted, no hepatomegaly, no splenomegaly, no mass


Vulva: both: normal


Vagina: normal moisture, no discharge


Cervix: no lesion, no discharge


Uterus: normal size, normal contour


Adnexa: both: normal


Anus/Rectum: normal perianal skin, no rectal mass, no hemorrhoids, heme negative





Results


Result Diagrams: 


 18 06:21





 Abnormal Lab Results - Last 24 Hours (Table)











  18 Range/Units





  06:21 


 


WBC  13.1 H  (3.8-10.6)  k/uL


 


Neutrophils #  9.1 H  (1.3-7.7)  k/uL














Assessment and Plan


Assessment: 





Intrauterine pregnancy at term with prior  section

## 2018-05-01 LAB
BASOPHILS # BLD AUTO: 0 K/UL (ref 0–0.2)
BASOPHILS NFR BLD AUTO: 0 %
EOSINOPHIL # BLD AUTO: 0.3 K/UL (ref 0–0.7)
EOSINOPHIL NFR BLD AUTO: 2 %
ERYTHROCYTE [DISTWIDTH] IN BLOOD BY AUTOMATED COUNT: 3.07 M/UL (ref 3.8–5.4)
ERYTHROCYTE [DISTWIDTH] IN BLOOD: 11.8 % (ref 11.5–15.5)
HCT VFR BLD AUTO: 28.6 % (ref 34–46)
HGB BLD-MCNC: 10.4 GM/DL (ref 11.4–16)
LYMPHOCYTES # SPEC AUTO: 3.3 K/UL (ref 1–4.8)
LYMPHOCYTES NFR SPEC AUTO: 21 %
MCH RBC QN AUTO: 33.9 PG (ref 25–35)
MCHC RBC AUTO-ENTMCNC: 36.3 G/DL (ref 31–37)
MCV RBC AUTO: 93.3 FL (ref 80–100)
MONOCYTES # BLD AUTO: 0.8 K/UL (ref 0–1)
MONOCYTES NFR BLD AUTO: 5 %
NEUTROPHILS # BLD AUTO: 11.1 K/UL (ref 1.3–7.7)
NEUTROPHILS NFR BLD AUTO: 71 %
PLATELET # BLD AUTO: 226 K/UL (ref 150–450)
WBC # BLD AUTO: 15.7 K/UL (ref 3.8–10.6)

## 2018-05-01 RX ADMIN — IBUPROFEN PRN MG: 600 TABLET ORAL at 23:35

## 2018-05-01 RX ADMIN — IBUPROFEN PRN MG: 600 TABLET ORAL at 12:42

## 2018-05-01 RX ADMIN — DOCUSATE SODIUM AND SENNOSIDES SCH: 50; 8.6 TABLET ORAL at 20:22

## 2018-05-01 RX ADMIN — POTASSIUM CHLORIDE SCH: 14.9 INJECTION, SOLUTION INTRAVENOUS at 00:54

## 2018-05-01 RX ADMIN — DOCUSATE SODIUM AND SENNOSIDES SCH EACH: 50; 8.6 TABLET ORAL at 10:06

## 2018-05-01 RX ADMIN — IBUPROFEN PRN MG: 600 TABLET ORAL at 06:31

## 2018-05-01 RX ADMIN — IBUPROFEN PRN MG: 600 TABLET ORAL at 17:54

## 2018-05-01 RX ADMIN — KETOROLAC TROMETHAMINE SCH: 30 INJECTION, SOLUTION INTRAMUSCULAR at 06:39

## 2018-05-01 NOTE — P.PNOBGPC
Subjective





- Subjective


Principal diagnosis: Postop day 1


Interval history: 





Doing very well.  Involuting, voiding, and she is tolerating her diet.


Patient reports: Reports appetite normal, Reports voiding normally, Reports 

pain well controlled, Reports ambulating normally


[place]: in NICU





Objective





- Vital Signs


Latest vital signs: 


 Vital Signs











  Temp Pulse Resp BP Pulse Ox


 


 18 05:57    16   97


 


 18 04:00  98.5 F  77  16  124/74  98


 


 18 02:00    16   97


 


 18 00:00  98.1 F  82  16  136/85  97


 


 18 22:00   77  14   97


 


 18 20:00  98.1 F  83  16  125/72  98


 


 18 18:25    16  


 


 18 17:00    16  


 


 18 16:00  98.1 F  71  16  128/94  97


 


 18 15:00  98.1 F  71  16  128/94  97


 


 18 13:17      99


 


 18 13:00    16   99


 


 18 12:00  97.7 F  64  16  133/90  99


 


 18 11:17    16   99


 


 18 10:45   73  16  123/86  99


 


 18 10:15   59 L  16  124/84  99


 


 18 09:45   71  16  125/89  98


 


 18 09:30   73  16  128/89  99


 


 18 09:17    16   98


 


 18 09:15   67  16  128/83  98


 


 18 09:00   71  16  132/82  98


 


 18 08:45  97.4 F L  81  16  120/73  97


 


 18 08:17    16  








 Intake and Output











 18





 22:59 06:59 14:59


 


Intake Total 2000  


 


Output Total 3000  


 


Balance -1000  


 


Intake:   


 


  Intake, IV Titration 1000  





  Amount   


 


    Lactated Ringers 1,000 ml 1000  





    @ 125 mls/hr IV .Q8H [facility]   





    Rx#:[id number]   


 


  Oral 1000  


 


Output:   


 


  Urine 3000  


 


    Uretheral ([place]) 1250  


 


Other:   


 


  # Voids 1 1 














- Exam


Lungs: bilateral: normal


Chest: Normal S1, Normal S2


Extremities: Present: normal


Abdomen: Present: normal appearance, soft.  Absent: distention, tenderness


Incision: Present: normal, dry, intact


Uterus: Present: normal, firm

## 2018-05-01 NOTE — P.PN
Progress Note - Text


Progress Note Date: 18





Postoperative day 1 status post  section under spinal anesthesia, and 

intrathecal morphine given for postoperative analgesia, patient doing well, 

there is no paresthesia related complications, further management as per her 

primary team. promote early emulation.

## 2018-05-02 VITALS
TEMPERATURE: 98.2 F | DIASTOLIC BLOOD PRESSURE: 78 MMHG | RESPIRATION RATE: 18 BRPM | HEART RATE: 92 BPM | SYSTOLIC BLOOD PRESSURE: 134 MMHG

## 2018-05-02 RX ADMIN — DOCUSATE SODIUM AND SENNOSIDES SCH: 50; 8.6 TABLET ORAL at 08:00

## 2018-05-02 NOTE — P.DS
Providers


Date of admission: 


04/30/18 06:02





Expected date of discharge: 05/02/18


Attending physician: 


Mohan Patiño





Primary care physician: 


Stated None





Hospital Course: 





Patient is doing overall well.  She is ambulating, voiding, and she is 

tolerating her diet.  She voices no complaints.  Vital signs are stable and 

afebrile.  Heart regular, lungs clear, extremities are without pain.  

Assessment postop day 2.  Plan discharged home follow up with me in 1 week.  

Prescription for Norco and Motrin has been provided and she will see me again 

in 1 week.  All other questions are answered for her at this time and she is 

stable for discharge this time.


Patient Condition at Discharge: Good





Plan - Discharge Summary


New Discharge Prescriptions: 


New


   HYDROcodone/APAP 5-325MG [Norco 5-325] 1 tab PO Q4HR PRN #20 tab


     PRN Reason: Pain


   Ibuprofen [Motrin] 600 mg PO Q6HR PRN #30 tab


     PRN Reason: Pain





No Action


   Pnv,Calcium 72/Iron/Folic Acid [Prenatal Plus Tablet] 1 tab PO DAILY


Discharge Medication List





Pnv,Calcium 72/Iron/Folic Acid [Prenatal Plus Tablet] 1 tab PO DAILY 03/14/18 [

History]


HYDROcodone/APAP 5-325MG [Norco 5-325] 1 tab PO Q4HR PRN #20 tab 05/02/18 [Rx]


Ibuprofen [Motrin] 600 mg PO Q6HR PRN #30 tab 05/02/18 [Rx]








Follow up Appointment(s)/Referral(s): 


Mohan Patiño DO [Doctor of Osteopathic Medicine] - 1 Week


Activity/Diet/Wound Care/Special Instructions: 


No heavy lifting, limit stairs and driving and pelvic rest.  If any high 

temperatures, heavy bleeding, or severe pain call my office


Discharge Disposition: HOME SELF-CARE

## 2018-09-26 ENCOUNTER — HOSPITAL ENCOUNTER (EMERGENCY)
Dept: HOSPITAL 47 - EC | Age: 23
Discharge: HOME | End: 2018-09-26
Payer: COMMERCIAL

## 2018-09-26 VITALS
TEMPERATURE: 98.2 F | RESPIRATION RATE: 18 BRPM | DIASTOLIC BLOOD PRESSURE: 86 MMHG | SYSTOLIC BLOOD PRESSURE: 123 MMHG | HEART RATE: 86 BPM

## 2018-09-26 DIAGNOSIS — Z88.8: ICD-10-CM
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
## 2018-09-26 DIAGNOSIS — F17.200: ICD-10-CM
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
## 2018-09-26 DIAGNOSIS — S63.614A: Primary | ICD-10-CM

## 2018-09-26 PROCEDURE — 99283 EMERGENCY DEPT VISIT LOW MDM: CPT

## 2018-09-26 NOTE — ED
Upper Extremity HPI





- General


Chief Complaint: Extremity Injury, Upper


Stated Complaint: rt hand injury


Time Seen by Provider: 18 18:54


Source: patient, RN notes reviewed


Mode of arrival: ambulatory


Limitations: no limitations





- History of Present Illness


Initial Comments: 


23-year-old female presents emergency Department with chief complaint of Right 

hand injury.  Patient states that she tripped over a toy striking a wall pain.  

Patient states that she has pain from the MCP region to the distal tip of her 

fifth digit.  She is right-hand-dominant.  Patient states she is able to flex 

her finger but causes discomfort.





- Related Data


 Home Medications











 Medication  Instructions  Recorded  Confirmed


 


Pnv,Calcium 72/Iron/Folic Acid 1 tab PO DAILY 18





[Prenatal Plus Tablet]   








 Previous Rx's











 Medication  Instructions  Recorded


 


HYDROcodone/APAP 5-325MG [Norco 1 tab PO Q4HR PRN #20 tab 18





5-325]  


 


Ibuprofen [Motrin] 600 mg PO Q6HR PRN #30 tab 18











 Allergies











Allergy/AdvReac Type Severity Reaction Status Date / Time


 


risperidone Allergy  Unknown Verified 18 18:49














Review of Systems


ROS Statement: 


Those systems with pertinent positive or pertinent negative responses have been 

documented in the HPI.





ROS Other: All systems not noted in ROS Statement are negative.





Past Medical History


Past Medical History: No Reported History


Additional Past Medical History / Comment(s): migraines


History of Any Multi-Drug Resistant Organisms: None Reported


Past Surgical History:  Section


Additional Past Surgical History / Comment(s): oral


Past Anesthesia/Blood Transfusion Reactions: No Reported Reaction


Past Psychological History: Anxiety, Bipolar


Smoking Status: Current every day smoker


Past Alcohol Use History: None Reported


Past Drug Use History: None Reported





- Past Family History


  ** Mother


Family Medical History: Congestive Heart Failure (CHF), CVA/TIA, Myocardial 

Infarction (MI)





  ** Father


Family Medical History: Hypertension





General Exam


Limitations: no limitations


General appearance: alert, in no apparent distress


Head exam: Present: atraumatic, normocephalic, normal inspection


Respiratory exam: Present: normal lung sounds bilaterally.  Absent: respiratory 

distress, wheezes, rales, rhonchi, stridor


Cardiovascular Exam: Present: regular rate, normal rhythm, normal heart sounds.

  Absent: systolic murmur, diastolic murmur, rubs, gallop, clicks


Extremities exam: Present: other (Right hand there is ecchymosis noted from the 

MCP to the PIP there is no obvious deformity patient has decreased range of 

motion second pain there is tenderness with palpation over the MCP)


Skin exam: Present: warm, dry, intact, normal color.  Absent: rash





Course


 Vital Signs











  18





  18:46


 


Temperature 98.2 F


 


Pulse Rate 86


 


Respiratory 18





Rate 


 


Blood Pressure 123/86


 


O2 Sat by Pulse 99





Oximetry 














Medical Decision Making





- Medical Decision Making





23-year-old female presented emergency department for right hand fifth digit 

injury.  X-rays were obtained no acute fracture.  Patient has a right finger 

sprain.  Patient was given a finger splint for pain control and will take over-

the-counter Tylenol Motrin.  Return parameters were discussed.





Disposition


Clinical Impression: 


 Finger sprain





Disposition: HOME SELF-CARE


Condition: Stable


Instructions:  Finger Sprain (ED)


Additional Instructions: 


Please return to the Emergency Department if symptoms worsen or any other 

concerns.


Is patient prescribed a controlled substance at d/c from ED?: No


Referrals: 


Seda Rivera MD [Primary Care Provider] - 1-2 days


Time of Disposition: 19:22

## 2018-09-26 NOTE — XR
EXAMINATION TYPE: XR hand complete RT

 

DATE OF EXAM: 9/26/2018

 

COMPARISON: 10/2/2017

 

HISTORY: Pain middle finger

 

TECHNIQUE: 3 views

 

FINDINGS: I see no fracture nor dislocation. Joint spaces are normal. Metacarpals are intact.

 

IMPRESSION: Mild soft tissue swelling around the PIP joint of the little finger. No fracture.

## 2019-01-24 ENCOUNTER — HOSPITAL ENCOUNTER (EMERGENCY)
Dept: HOSPITAL 47 - EC | Age: 24
Discharge: HOME | End: 2019-01-24
Payer: COMMERCIAL

## 2019-01-24 VITALS
DIASTOLIC BLOOD PRESSURE: 77 MMHG | TEMPERATURE: 98.6 F | RESPIRATION RATE: 18 BRPM | SYSTOLIC BLOOD PRESSURE: 116 MMHG | HEART RATE: 81 BPM

## 2019-01-24 DIAGNOSIS — K08.89: Primary | ICD-10-CM

## 2019-01-24 DIAGNOSIS — Z88.8: ICD-10-CM

## 2019-01-24 DIAGNOSIS — F17.200: ICD-10-CM

## 2019-01-24 PROCEDURE — 99282 EMERGENCY DEPT VISIT SF MDM: CPT

## 2019-01-24 NOTE — ED
ENT HPI





- General


Source: patient


Mode of arrival: ambulatory


Limitations: no limitations





<Lois Vera - Last Filed: 19 01:54>





<Sonia Carrasco - Last Filed: 19 00:35>





- General


Chief complaint: Dental/Oral


Stated complaint: Dental Pain


Time Seen by Provider: 19 22:52





- History of Present Illness


Initial comments: 


24-year-old female who denies past medical history presenting today for chief 

complaint of left-sided tooth pain.  Patient states she has both upper and 

lower left-sided dental pain.  She denies facial swelling.  Patient states she 

has been cracked teeth and black tea.  She denies any fever, chills, night 

sweats.  Patient denies a swelling of the neck, tongue or below tongue. Patient 

denies any trauma to the mouth or dentition. Remainder review of systems 

negative, patient denies any recent shortness of breath, chest pain, back pain, 

abdominal pain, nausea or vomiting, numbness or tingling, dysuria or hematuria, 

constipation or diarrhea, headaches or visual changes, or any other complaints. 


 (Lois Vera)





- Related Data


 Previous Rx's











 Medication  Instructions  Recorded


 


Ibuprofen 800 mg PO Q8H PRN 7 Days #21 tablet 19


 


Penicillin V Potassium [Pen Vee K] 500 mg PO QID 7 Days #28 tablet 19











 Allergies











Allergy/AdvReac Type Severity Reaction Status Date / Time


 


risperidone Allergy  Unknown Verified 19 23:04














Review of Systems


ROS Other: All systems not noted in ROS Statement are negative.





<Lois Vera - Last Filed: 19 01:54>


ROS Other: All systems not noted in ROS Statement are negative.





<Sonia Carrasco - Last Filed: 19 00:35>


ROS Statement: 


Those systems with pertinent positive or pertinent negative responses have been 

documented in the HPI.








Past Medical History


Past Medical History: No Reported History


Additional Past Medical History / Comment(s): migraines


History of Any Multi-Drug Resistant Organisms: None Reported


Past Surgical History:  Section


Additional Past Surgical History / Comment(s): oral


Past Anesthesia/Blood Transfusion Reactions: No Reported Reaction


Past Psychological History: Anxiety, Bipolar


Smoking Status: Current every day smoker


Past Alcohol Use History: Rare


Past Drug Use History: None Reported





- Past Family History


  ** Mother


Family Medical History: Congestive Heart Failure (CHF), CVA/TIA, Myocardial 

Infarction (MI)





  ** Father


Family Medical History: Hypertension





<Lois Vera - Last Filed: 19 01:54>





General Exam


Limitations: no limitations





<Lois Vera - Last Filed: 19 01:54>





<Sonia Carrasco - Last Filed: 19 00:35>





- General Exam Comments


Initial Comments: 


General:  The patient is awake and alert, in no distress, and does not appear 

acutely ill. 


Eye:  Pupils are equal, round and reactive to light, extra-ocular movements are 

intact.  No nystagmus.  There is normal conjunctiva bilaterally.  No signs of 

icterus.  


Ears, nose, mouth and throat:  There are moist mucous membranes and no oral 

lesions.  Patient's overall poor dentition, every tooth has decay, multiple 

cracked teeth. tooth #32 cracked.  No areas of fluctuance.  All teeth painful 

to percussion.  No swelling below tongue or under the angle of the mandible.  

No anterior cervical lymphadenopathy.


Neck:  The neck is supple, there is no tenderness or JVD.  


Cardiovascular:  There is a regular rate and rhythm. No murmur, rub or gallop 

is appreciated.


Respiratory:  Lungs are clear to auscultation, respirations are non-labored, 

breath sounds are equal.  No wheezes, stridor, rales, or rhonchi.


Musculoskeletal:  Normal ROM, no tenderness.  Strength 5/5. Sensation intact. 

Pulses equal bilaterally 2+.  


Neurological:  A&O x 3. CN II-XII intact, There are no obvious motor or sensory 

deficits. Coordination appears grossly intact. Speech is normal.


Skin:  Skin is warm and dry and no rashes or lesions are noted. 


Psychiatric:  Cooperative, appropriate mood & affect, normal judgment.  


 (Lois Vera)





 Vital Signs











  19





  22:50 23:38


 


Temperature 97.8 F 98.6 F


 


Pulse Rate 82 81


 


Respiratory 20 18





Rate  


 


Blood Pressure 122/80 116/77


 


O2 Sat by Pulse 100 98





Oximetry  














Medical Decision Making





<Lois Vera - Last Filed: 19 01:54>





<Sonia Carrasco - Last Filed: 19 00:35>





- Medical Decision Making


24-year-old overall poor dentition.  There is no evidence of obvious dental 

abscess.  Differential diagnosis pulpitis versus periapical abscess.  Patient 

prescribed Penicillin VK for infection prophylaxis/possible treatment.  Patient 

denies any ALLERGIES to medications.  No evidence concerning for Jonah angina 

at this time.  No systemic sign of infection.  Negative ROS is negative.  

Patient will be discharged with ibuprofen 800 for pain management.  Patient is 

agreeable plan discharge.  I discussed the importance of extraction of affected 

teeth.  Patient verbalized understanding.  Return parameters discussed at 

length the patient who verbalized understanding.


 (Lois Vera)


I was available for consultation in the emergency department. The history and 

physical exam were done by the Midlevel Provider. Medical decision making was 

done by the Midlevel Provider.  The Midlevel Provider did not contact me for 

this patient's care. I was not directly involved in this patient's care. 


 (Sonia Carrasco)





Disposition


Is patient prescribed a controlled substance at d/c from ED?: No


Time of Disposition: 23:21





<Lois Vera - Last Filed: 19 01:54>





<Sonia Carrasco - Last Filed: 19 00:35>


Clinical Impression: 


 Pain, dental





Disposition: HOME SELF-CARE


Condition: Good


Instructions (If sedation given, give patient instructions):  Dental Abscess (ED

), Dental Caries (ED)


Prescriptions: 


Ibuprofen 800 mg PO Q8H PRN 7 Days #21 tablet


 PRN Reason: Pain


Penicillin V Potassium [Pen Vee K] 500 mg PO QID 7 Days #28 tablet


Referrals: 


Seda Rivera MD [Primary Care Provider] - 1-2 days


Doug Huddleston DDS [STAFF PHYSICIAN] - 1-2 days

## 2019-08-26 ENCOUNTER — HOSPITAL ENCOUNTER (EMERGENCY)
Dept: HOSPITAL 47 - EC | Age: 24
LOS: 1 days | Discharge: HOME | End: 2019-08-27
Payer: COMMERCIAL

## 2019-08-26 VITALS — TEMPERATURE: 97.8 F
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
## 2019-08-26 DIAGNOSIS — S30.1XXA: Primary | ICD-10-CM

## 2019-08-26 DIAGNOSIS — Z79.899: ICD-10-CM

## 2019-08-26 DIAGNOSIS — F17.200: ICD-10-CM

## 2019-08-26 DIAGNOSIS — F31.9: ICD-10-CM
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
## 2019-08-26 DIAGNOSIS — F41.9: ICD-10-CM

## 2019-08-26 DIAGNOSIS — Z88.8: ICD-10-CM
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
## 2019-08-26 LAB
ALBUMIN SERPL-MCNC: 3.3 G/DL (ref 3.5–5)
ALP SERPL-CCNC: 62 U/L (ref 38–126)
ALT SERPL-CCNC: 10 U/L (ref 9–52)
ANION GAP SERPL CALC-SCNC: 8 MMOL/L
APTT BLD: 26.6 SEC (ref 22–30)
AST SERPL-CCNC: 14 U/L (ref 14–36)
BASOPHILS # BLD AUTO: 0.1 K/UL (ref 0–0.2)
BASOPHILS NFR BLD AUTO: 1 %
BUN SERPL-SCNC: 7 MG/DL (ref 7–17)
CALCIUM SPEC-MCNC: 9.1 MG/DL (ref 8.4–10.2)
CHLORIDE SERPL-SCNC: 110 MMOL/L (ref 98–107)
CO2 SERPL-SCNC: 21 MMOL/L (ref 22–30)
EOSINOPHIL # BLD AUTO: 1 K/UL (ref 0–0.7)
EOSINOPHIL NFR BLD AUTO: 7 %
ERYTHROCYTE [DISTWIDTH] IN BLOOD BY AUTOMATED COUNT: 4.79 M/UL (ref 3.8–5.4)
ERYTHROCYTE [DISTWIDTH] IN BLOOD: 12.7 % (ref 11.5–15.5)
GLUCOSE SERPL-MCNC: 95 MG/DL (ref 74–99)
HCT VFR BLD AUTO: 45.4 % (ref 34–46)
HGB BLD-MCNC: 14.9 GM/DL (ref 11.4–16)
INR PPP: 1 (ref ?–1.2)
LYMPHOCYTES # SPEC AUTO: 2.9 K/UL (ref 1–4.8)
LYMPHOCYTES NFR SPEC AUTO: 20 %
MCH RBC QN AUTO: 31 PG (ref 25–35)
MCHC RBC AUTO-ENTMCNC: 32.7 G/DL (ref 31–37)
MCV RBC AUTO: 94.7 FL (ref 80–100)
MONOCYTES # BLD AUTO: 0.5 K/UL (ref 0–1)
MONOCYTES NFR BLD AUTO: 3 %
NEUTROPHILS # BLD AUTO: 10.4 K/UL (ref 1.3–7.7)
NEUTROPHILS NFR BLD AUTO: 69 %
PH UR: 5 [PH] (ref 5–8)
PLATELET # BLD AUTO: 243 K/UL (ref 150–450)
POTASSIUM SERPL-SCNC: 3.8 MMOL/L (ref 3.5–5.1)
PROT SERPL-MCNC: 5.6 G/DL (ref 6.3–8.2)
PT BLD: 10.9 SEC (ref 9–12)
SODIUM SERPL-SCNC: 139 MMOL/L (ref 137–145)
SP GR UR: 1 (ref 1–1.03)
UROBILINOGEN UR QL STRIP: <2 MG/DL (ref ?–2)
WBC # BLD AUTO: 15 K/UL (ref 3.8–10.6)

## 2019-08-26 PROCEDURE — 85610 PROTHROMBIN TIME: CPT

## 2019-08-26 PROCEDURE — 71045 X-RAY EXAM CHEST 1 VIEW: CPT

## 2019-08-26 PROCEDURE — 80053 COMPREHEN METABOLIC PANEL: CPT

## 2019-08-26 PROCEDURE — 99284 EMERGENCY DEPT VISIT MOD MDM: CPT

## 2019-08-26 PROCEDURE — 85025 COMPLETE CBC W/AUTO DIFF WBC: CPT

## 2019-08-26 PROCEDURE — 84484 ASSAY OF TROPONIN QUANT: CPT

## 2019-08-26 PROCEDURE — 81025 URINE PREGNANCY TEST: CPT

## 2019-08-26 PROCEDURE — 93005 ELECTROCARDIOGRAM TRACING: CPT

## 2019-08-26 PROCEDURE — 74177 CT ABD & PELVIS W/CONTRAST: CPT

## 2019-08-26 PROCEDURE — 86901 BLOOD TYPING SEROLOGIC RH(D): CPT

## 2019-08-26 PROCEDURE — 36415 COLL VENOUS BLD VENIPUNCTURE: CPT

## 2019-08-26 PROCEDURE — 81003 URINALYSIS AUTO W/O SCOPE: CPT

## 2019-08-26 PROCEDURE — 96360 HYDRATION IV INFUSION INIT: CPT

## 2019-08-26 PROCEDURE — 72170 X-RAY EXAM OF PELVIS: CPT

## 2019-08-26 PROCEDURE — 85730 THROMBOPLASTIN TIME PARTIAL: CPT

## 2019-08-26 PROCEDURE — 86900 BLOOD TYPING SEROLOGIC ABO: CPT

## 2019-08-26 PROCEDURE — 86850 RBC ANTIBODY SCREEN: CPT

## 2019-08-26 PROCEDURE — 80320 DRUG SCREEN QUANTALCOHOLS: CPT
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
## 2019-08-26 NOTE — XR
EXAM:

  XR Pelvis, 1 or 2 Views

 

CLINICAL HISTORY:

  Back pain and hip pain Reason: Trauma

 

TECHNIQUE:

  Frontal view of the pelvis.

 

COMPARISON:

  None.

 

FINDINGS:

  The bony pelvis is tilted left of midline.

  No fracture or dislocation is noted.

  A metallic foreign body projects centrally within the pelvis of 

uncertain etiology.

  The visualized lumbar spine is unremarkable.

 

IMPRESSION:   

  No fracture or dislocation.  Metallic foreign body projecting centrally 

within the pelvis of uncertain etiology.  Clinical correlation is advised.

## 2019-08-27 VITALS — RESPIRATION RATE: 18 BRPM | HEART RATE: 83 BPM | DIASTOLIC BLOOD PRESSURE: 84 MMHG | SYSTOLIC BLOOD PRESSURE: 122 MMHG

## 2019-08-27 NOTE — CT
EXAM:

  CT Abdomen and Pelvis With Intravenous Contrast

 

CLINICAL HISTORY:

  ITS.REASON CT Reason: Pain

 

TECHNIQUE:

  Axial computed tomography images of the abdomen and pelvis with 

intravenous contrast.  This CT exam was performed using one or more of 

the following dose reduction techniques: automated exposure control, 

adjustment of the mA and/or kV according to patient size, and/or use of 

iterative reconstruction technique.

 

COMPARISON:

  No relevant prior studies available.

 

FINDINGS:

  Lung bases:  Unremarkable.  No mass.  No consolidation.

 

 ABDOMEN:

  Liver:  Unremarkable.  No mass.

  Gallbladder and bile ducts:  No abnormal ductal dilation or stones.

  Pancreas:  Unremarkable.  No mass.  No ductal dilation.

  Spleen:  Unremarkable.  No splenomegaly.

  Adrenals:  Unremarkable.  No mass.

  Kidneys and ureters:  Unremarkable.  No solid mass.  No hydronephrosis.

  Stomach and bowel:  No obstruction.  No mucosal thickening.

 

 PELVIS:

  Appendix:  No findings to suggest acute appendicitis.

  Bladder:  Unremarkable.  No mass.

  Reproductive:  3.3 cm right adnexal cyst.

 

 ABDOMEN and PELVIS:

  Intraperitoneal space:  Unremarkable.  No free air.  No significant 

fluid collection.

  Bones/joints:  No acute fracture.  No dislocation.

  Soft tissues:  Unremarkable.

  Vasculature:  No abdominal aortic aneurysm.

  Lymph nodes:  Unremarkable.  No enlarged lymph nodes.

 

IMPRESSION:     

  3.3 cm right adnexal cyst.

## 2019-12-25 ENCOUNTER — HOSPITAL ENCOUNTER (EMERGENCY)
Dept: HOSPITAL 47 - EC | Age: 24
Discharge: HOME | End: 2019-12-25
Payer: COMMERCIAL

## 2019-12-25 VITALS
DIASTOLIC BLOOD PRESSURE: 91 MMHG | HEART RATE: 83 BPM | RESPIRATION RATE: 18 BRPM | TEMPERATURE: 97.9 F | SYSTOLIC BLOOD PRESSURE: 149 MMHG

## 2019-12-25 DIAGNOSIS — F17.200: ICD-10-CM

## 2019-12-25 DIAGNOSIS — Z79.899: ICD-10-CM

## 2019-12-25 DIAGNOSIS — Z88.8: ICD-10-CM

## 2019-12-25 DIAGNOSIS — K08.409: ICD-10-CM

## 2019-12-25 DIAGNOSIS — K02.9: Primary | ICD-10-CM

## 2019-12-25 PROCEDURE — 99283 EMERGENCY DEPT VISIT LOW MDM: CPT

## 2019-12-25 NOTE — ED
ENT HPI





- General


Chief complaint: Dental/Oral


Stated complaint: Dental pain


Time Seen by Provider: 19 10:58


Source: patient, RN notes reviewed, old records reviewed


Mode of arrival: ambulatory


Limitations: no limitations





- History of Present Illness


Initial comments: 





Patient is a 24-year-old female who presents emergency Department stay for evalu

ation for concern for dental pain.  She reports that she has poor dentition.  

She states that she does not have an appointment with a dentist at this time.  

Patient states that she's had no fevers or chills.  She states that she's had 

worsening pain over the past 4 days.





- Related Data


                                Home Medications











 Medication  Instructions  Recorded  Confirmed


 


ALPRAZolam [Xanax] 0.25 mg PO HS PRN 19


 


Atomoxetine HCl [Strattera] 25 mg PO DAILY 19


 


SUMAtriptan SUCCINATE [Imitrex] 50 mg PO DAILY PRN 19








                                  Previous Rx's











 Medication  Instructions  Recorded


 


Penicillin V Potassium [Pen Vee K] 500 mg PO QID #40 tablet 19











                                    Allergies











Allergy/AdvReac Type Severity Reaction Status Date / Time


 


risperidone Allergy  Unknown Verified 19 22:38














Review of Systems


ROS Statement: 


Those systems with pertinent positive or pertinent negative responses have been 

documented in the HPI.





ROS Other: All systems not noted in ROS Statement are negative.





Past Medical History


Past Medical History: No Reported History


Additional Past Medical History / Comment(s): migraines


History of Any Multi-Drug Resistant Organisms: None Reported


Past Surgical History:  Section


Additional Past Surgical History / Comment(s): oral


Past Anesthesia/Blood Transfusion Reactions: No Reported Reaction


Past Psychological History: Anxiety, Bipolar


Smoking Status: Current every day smoker


Past Alcohol Use History: Rare


Past Drug Use History: None Reported





- Past Family History


  ** Mother


Family Medical History: Congestive Heart Failure (CHF), CVA/TIA, Myocardial 

Infarction (MI)





  ** Father


Family Medical History: Hypertension





General Exam





- General Exam Comments


Initial Comments: 





24 old female.  No distress.


Limitations: no limitations


General appearance: alert, in no apparent distress


Head exam: Present: atraumatic, normocephalic, normal inspection


Eye exam: Present: normal appearance, PERRL, EOMI.  Absent: scleral icterus, 

conjunctival injection, periorbital swelling


ENT exam: Present: normal exam, mucous membranes moist, other (Patient has very 

poor dentition.  Multiple caries.  Missing teeth 7910.  Patient has cavities 

over 13 and 14 and 15.  No drainable abscess at this time.)


Neck exam: Present: normal inspection.  Absent: tenderness, meningismus, 

lymphadenopathy


Respiratory exam: Present: normal lung sounds bilaterally.  Absent: respiratory 

distress, wheezes, rales, rhonchi, stridor


Cardiovascular Exam: Present: regular rate, normal rhythm, normal heart sounds. 

Absent: systolic murmur, diastolic murmur, rubs, gallop, clicks


GI/Abdominal exam: Present: soft


Extremities exam: Present: normal inspection, full ROM, normal capillary refill.

 Absent: tenderness, pedal edema, joint swelling, calf tenderness


Back exam: Present: normal inspection


Neurological exam: Present: alert, oriented X3, CN II-XII intact


Psychiatric exam: Present: normal affect, normal mood


Skin exam: Present: warm, dry, intact, normal color.  Absent: rash





Course


                                   Vital Signs











  19





  10:09


 


Temperature 97.9 F


 


Pulse Rate 83


 


Respiratory 18





Rate 


 


Blood Pressure 149/91


 


O2 Sat by Pulse 100





Oximetry 














Medical Decision Making





- Medical Decision Making





24-year-old female with severe   poor dentition.  His multiple missing teeth and

multiple dental caries.  She points of left upper dental pain.  At this time 

patient's advised to be placed on penicillin, and will give Patient once her 

proper pain medication.  Discussion is follow-up with dentist in proper dental 

hygiene.





Disposition


Clinical Impression: 


 Dental caries, Pain, dental





Disposition: HOME SELF-CARE


Condition: Stable


Instructions (If sedation given, give patient instructions):  Toothache (ED)


Additional Instructions: 





[facility]





[address] [place] [address]





[phone]  [phone] (existing clients only)





For new clients: [phone]





1st consult: $50 (includes Xrays)





Usually 30% less then private dentist for visits after. 





[facility]





Have to pay $50 for Xrays anmd rest is covered.





[phone]


Prescriptions: 


Penicillin V Potassium [Pen Vee K] 500 mg PO QID #40 tablet


Is patient prescribed a controlled substance at d/c from ED?: No


Referrals: 


Seda Rivera MD [Primary Care Provider] - 1-2 days


Time of Disposition: 11:09

## 2020-01-07 ENCOUNTER — HOSPITAL ENCOUNTER (EMERGENCY)
Dept: HOSPITAL 47 - EC | Age: 25
Discharge: HOME | End: 2020-01-07
Payer: COMMERCIAL

## 2020-01-07 VITALS — TEMPERATURE: 98.3 F

## 2020-01-07 VITALS — DIASTOLIC BLOOD PRESSURE: 77 MMHG | HEART RATE: 71 BPM | SYSTOLIC BLOOD PRESSURE: 132 MMHG | RESPIRATION RATE: 20 BRPM

## 2020-01-07 DIAGNOSIS — Z88.8: ICD-10-CM

## 2020-01-07 DIAGNOSIS — F17.200: ICD-10-CM

## 2020-01-07 DIAGNOSIS — F41.9: ICD-10-CM

## 2020-01-07 DIAGNOSIS — Z79.899: ICD-10-CM

## 2020-01-07 DIAGNOSIS — F31.9: ICD-10-CM

## 2020-01-07 DIAGNOSIS — K02.9: Primary | ICD-10-CM

## 2020-01-07 PROCEDURE — 99283 EMERGENCY DEPT VISIT LOW MDM: CPT

## 2020-01-07 NOTE — ED
ENT HPI





- General


Chief complaint: Dental/Oral


Stated complaint: dental pain


Time Seen by Provider: 20 19:46


Source: patient, RN notes reviewed, old records reviewed


Mode of arrival: ambulatory


Limitations: no limitations





- History of Present Illness


Initial comments: 





25 year old female with dental caries and dental pain. She was seen on [place]

for similar complaints. PAtient reports she finished antibiotics. PAtient states

has no fevers or chills. She states that she has no trismus. She denies 

sorethroat or abdominal pain. 


MD complaint: tooth pain





- Related Data


                                Home Medications











 Medication  Instructions  Recorded  Confirmed


 


ALPRAZolam [Xanax] 0.25 mg PO HS PRN 19


 


Atomoxetine HCl [Strattera] 25 mg PO DAILY 19


 


SUMAtriptan SUCCINATE [Imitrex] 50 mg PO DAILY PRN 19








                                  Previous Rx's











 Medication  Instructions  Recorded


 


Penicillin V Potassium [Pen Vee K] 500 mg PO QID #40 tablet 19


 


Penicillin V Potassium [Pen Vee K] 500 mg PO QID #40 tablet 20











                                    Allergies











Allergy/AdvReac Type Severity Reaction Status Date / Time


 


risperidone Allergy  Unknown Verified 20 19:06














Review of Systems


ROS Statement: 


Those systems with pertinent positive or pertinent negative responses have been 

documented in the HPI.





ROS Other: All systems not noted in ROS Statement are negative.





Past Medical History


Past Medical History: No Reported History


Additional Past Medical History / Comment(s): migraines


History of Any Multi-Drug Resistant Organisms: None Reported


Past Surgical History:  Section


Additional Past Surgical History / Comment(s): oral


Past Anesthesia/Blood Transfusion Reactions: No Reported Reaction


Past Psychological History: Anxiety, Bipolar


Smoking Status: Current every day smoker


Past Alcohol Use History: Rare


Past Drug Use History: None Reported





- Past Family History


  ** Mother


Family Medical History: Congestive Heart Failure (CHF), CVA/TIA, Myocardial 

Infarction (MI)





  ** Father


Family Medical History: Hypertension





General Exam





- General Exam Comments


Initial Comments: 





25 year old female, no distress. 


Limitations: no limitations


General appearance: alert, in no apparent distress


Head exam: Present: atraumatic, normocephalic, normal inspection


Eye exam: Present: normal appearance, PERRL, EOMI.  Absent: scleral icterus, 

conjunctival injection, periorbital swelling


ENT exam: Present: normal exam, mucous membranes moist, other (poor dentition, 

multiple decaying and rotten teeth)


Neck exam: Present: normal inspection.  Absent: tenderness, meningismus, 

lymphadenopathy


Respiratory exam: Present: normal lung sounds bilaterally.  Absent: respiratory 

distress, wheezes, rales, rhonchi, stridor





Course


                                   Vital Signs











  20





  19:03 20:22


 


Temperature 98.3 F 98.3 F


 


Pulse Rate 75 71


 


Respiratory 16 20





Rate  


 


Blood Pressure 129/74 132/77


 


O2 Sat by Pulse 98 99





Oximetry  














Medical Decision Making





- Medical Decision Making





25 year old female with dental pain adn poor dentition. Patient at this time has

no drainable abscess. PAtient appears in no distress. PAtient advised to restart

antibiotics and needs to follow up with dentist. She has dental caries in each 

tooth and all decaying. Discussed all teeth need pulled. Discussed return 

parameters. 





Disposition


Clinical Impression: 


 Dental caries





Disposition: HOME SELF-CARE


Condition: Good


Instructions (If sedation given, give patient instructions):  Toothache (ED)


Additional Instructions: 





[facility]





[address] [place] [address]





[phone]  [phone] (existing clients only)





For new clients: [phone]





1st consult: $50 (includes Xrays)





Usually 30% less then private dentist for visits after. 





[employer] [facility] [employer]





Have to pay $50 for Xrays anmd rest is covered.





[phone]


Prescriptions: 


Penicillin V Potassium [Pen Vee K] 500 mg PO QID #40 tablet


Is patient prescribed a controlled substance at d/c from ED?: No


Referrals: 


Seda Rivera MD [Primary Care Provider] - 1-2 days


Time of Disposition: 20:02

## 2020-02-16 ENCOUNTER — HOSPITAL ENCOUNTER (EMERGENCY)
Dept: HOSPITAL 47 - EC | Age: 25
LOS: 1 days | Discharge: HOME | End: 2020-02-17
Payer: COMMERCIAL

## 2020-02-16 DIAGNOSIS — Z79.899: ICD-10-CM

## 2020-02-16 DIAGNOSIS — S02.5XXA: Primary | ICD-10-CM

## 2020-02-16 DIAGNOSIS — Z88.8: ICD-10-CM

## 2020-02-16 DIAGNOSIS — F17.200: ICD-10-CM

## 2020-02-16 DIAGNOSIS — K02.9: ICD-10-CM

## 2020-02-16 PROCEDURE — 99283 EMERGENCY DEPT VISIT LOW MDM: CPT

## 2020-02-17 VITALS
RESPIRATION RATE: 18 BRPM | DIASTOLIC BLOOD PRESSURE: 72 MMHG | SYSTOLIC BLOOD PRESSURE: 122 MMHG | HEART RATE: 78 BPM | TEMPERATURE: 98 F

## 2020-02-17 NOTE — ED
ENT HPI





- General


Chief complaint: Dental/Oral


Stated complaint: dental pain


Time Seen by Provider: 20 23:45


Source: patient, RN notes reviewed, old records reviewed


Mode of arrival: ambulatory


Limitations: no limitations





- History of Present Illness


MD complaint: tooth pain


-: days(s)


Location: tooth # (right upper molars)


Severity: moderate


Severity scale (1-10): 6


Quality: stabbing, aching


Consistency: constant


Improves with: none


Worsens with: eating


Context- Dental: history of dental caries





- Related Data


                                Home Medications











 Medication  Instructions  Recorded  Confirmed


 


ALPRAZolam [Xanax] 0.25 mg PO HS PRN 19


 


Atomoxetine HCl [Strattera] 25 mg PO DAILY 19


 


SUMAtriptan SUCCINATE [Imitrex] 50 mg PO DAILY PRN 19








                                  Previous Rx's











 Medication  Instructions  Recorded


 


Penicillin V Potassium [Pen Vee K] 500 mg PO QID #40 tablet 19


 


Penicillin V Potassium [Pen Vee K] 500 mg PO QID #40 tablet 20











                                    Allergies











Allergy/AdvReac Type Severity Reaction Status Date / Time


 


risperidone Allergy  Unknown Verified 20 23:42














Review of Systems


ROS Statement: 


Those systems with pertinent positive or pertinent negative responses have been 

documented in the HPI.





ROS Other: All systems not noted in ROS Statement are negative.





Past Medical History


Past Medical History: No Reported History


Additional Past Medical History / Comment(s): migraines


History of Any Multi-Drug Resistant Organisms: None Reported


Past Surgical History:  Section


Additional Past Surgical History / Comment(s): oral


Past Anesthesia/Blood Transfusion Reactions: No Reported Reaction


Past Psychological History: Anxiety, Bipolar


Smoking Status: Current every day smoker


Past Alcohol Use History: Rare


Past Drug Use History: None Reported





- Past Family History


  ** Mother


Family Medical History: Congestive Heart Failure (CHF), CVA/TIA, Myocardial 

Infarction (MI)





  ** Father


Family Medical History: Hypertension





General Exam


Limitations: no limitations


General appearance: alert, in no apparent distress


Head exam: Present: atraumatic, normocephalic, normal inspection


Eye exam: Present: normal appearance, PERRL, EOMI.  Absent: scleral icterus, 

conjunctival injection, periorbital swelling


ENT exam: Present: normal exam, mucous membranes moist


Neck exam: Present: normal inspection.  Absent: tenderness, meningismus, 

lymphadenopathy


Respiratory exam: Present: normal lung sounds bilaterally.  Absent: respiratory 

distress, wheezes, rales, rhonchi, stridor


Cardiovascular Exam: Present: regular rate, normal rhythm, normal heart sounds. 

Absent: systolic murmur, diastolic murmur, rubs, gallop, clicks


GI/Abdominal exam: Present: soft, normal bowel sounds.  Absent: distended, 

tenderness, guarding, rebound, rigid


Extremities exam: Present: normal inspection, full ROM, normal capillary refill.

 Absent: tenderness, pedal edema, joint swelling, calf tenderness


Back exam: Present: normal inspection


Neurological exam: Present: alert, oriented X3, CN II-XII intact


Psychiatric exam: Present: normal affect, normal mood


Skin exam: Present: warm, dry, intact, normal color.  Absent: rash





Course





                                   Vital Signs











  20





  23:40


 


Temperature 98.0 F


 


Pulse Rate 90


 


Respiratory 20





Rate 


 


Blood Pressure 152/78


 


O2 Sat by Pulse 99





Oximetry 














Disposition


Clinical Impression: 


 Dental caries, Fracture of tooth





Disposition: HOME SELF-CARE


Condition: Good


Instructions (If sedation given, give patient instructions):  Dental Abscess 

(ED), Toothache (ED)


Is patient prescribed a controlled substance at d/c from ED?: No


Referrals: 


Seda Rivera MD [Primary Care Provider] - 1-2 days

## 2020-03-16 ENCOUNTER — HOSPITAL ENCOUNTER (EMERGENCY)
Age: 25
Discharge: HOME | End: 2020-03-16
Payer: COMMERCIAL

## 2020-03-16 PROCEDURE — 99282 EMERGENCY DEPT VISIT SF MDM: CPT

## 2022-10-16 ENCOUNTER — HOSPITAL ENCOUNTER (EMERGENCY)
Dept: HOSPITAL 47 - EC | Age: 27
Discharge: HOME | End: 2022-10-16
Payer: COMMERCIAL

## 2022-10-16 VITALS
RESPIRATION RATE: 20 BRPM | TEMPERATURE: 98.3 F | SYSTOLIC BLOOD PRESSURE: 127 MMHG | DIASTOLIC BLOOD PRESSURE: 73 MMHG | HEART RATE: 108 BPM

## 2022-10-16 DIAGNOSIS — F17.200: ICD-10-CM

## 2022-10-16 DIAGNOSIS — Z20.822: ICD-10-CM

## 2022-10-16 DIAGNOSIS — J02.9: Primary | ICD-10-CM

## 2022-10-16 PROCEDURE — 96372 THER/PROPH/DIAG INJ SC/IM: CPT

## 2022-10-16 PROCEDURE — 87651 STREP A DNA AMP PROBE: CPT

## 2022-10-16 PROCEDURE — 87635 SARS-COV-2 COVID-19 AMP PRB: CPT

## 2022-10-16 PROCEDURE — 99283 EMERGENCY DEPT VISIT LOW MDM: CPT

## 2022-10-16 NOTE — ED
ENT HPI





- General


Chief complaint: ENT


Stated complaint: Sore throat, Ear infection


Time Seen by Provider: 10/16/22 16:29


Source: patient, RN notes reviewed


Mode of arrival: ambulatory


Limitations: no limitations





- History of Present Illness


Initial comments: 





This is a pleasant 27-year-old female presents emergency by complaining of 

bilateral ear pain as well as a sore throat which has been going on since last 

Thursday.  He can spend about final.  No fever.  No neck stiffness.  No skin 

rashes or lesions.  Patient denies chance of pregnancy.





No headache, no fever or chills, no changes in vision or hearing, POSITIVE 

bilateral ear pain or difficulty with speech, no neck pain, no chest pain or 

shortness of breath, no abdominal pain, no nausea or vomiting, no changes in 

urination or bowel movements, no numbness or tingling, no extremity pain, no 

skin rashes or lesions.





Past medical, surgical, social, and family history reviewed.


MD complaint: sore throat, ear pain





- Related Data


                                Home Medications











 Medication  Instructions  Recorded  Confirmed


 


No Known Home Medications  10/16/22 10/16/22











                                    Allergies











Allergy/AdvReac Type Severity Reaction Status Date / Time


 


risperidone AdvReac  Leg Verified 10/16/22 17:55





   numbness  














Review of Systems


ROS Statement: 


Those systems with pertinent positive or pertinent negative responses have been 

documented in the HPI.





ROS Other: All systems not noted in ROS Statement are negative.





Past Medical History


Past Medical History: No Reported History


Additional Past Medical History / Comment(s): migraines


History of Any Multi-Drug Resistant Organisms: None Reported


Past Surgical History:  Section


Additional Past Surgical History / Comment(s): oral


Past Anesthesia/Blood Transfusion Reactions: No Reported Reaction


Past Psychological History: Anxiety, Bipolar


Smoking Status: Current every day smoker


Past Alcohol Use History: Rare


Past Drug Use History: None Reported





- Past Family History


  ** Mother


Family Medical History: Congestive Heart Failure (CHF), CVA/TIA, Myocardial 

Infarction (MI)





  ** Father


Family Medical History: Hypertension





General Exam





- General Exam Comments


Initial Comments: 





Patient does not appear to be ill or toxic.  Appears very well hydrated.  

Capillary refill less than 2 seconds.  No mottling.


Limitations: no limitations


General appearance: alert, in no apparent distress


Head exam: Present: atraumatic, normocephalic, normal inspection


Eye exam: Present: normal appearance, PERRL, EOMI.  Absent: scleral icterus, 

conjunctival injection, periorbital swelling


ENT exam: Present: normal exam, normal oropharynx, mucous membranes moist, 

normal external ear exam, other (Patient has mild retraction noted to both 

tympanic membranes.  However good light reflex.  No effusion.  No perforation.  

No erythema.).  Absent: mucous membranes dry


  ** Expanded


Ear exam: Present: normal external inspection, auricular trauma


Mouth exam: Present: normal external inspection, tongue normal.  Absent: 

drooling, trismus, muffled voice, tongue elevation, laceration


Teeth exam: Present: normal inspection


Throat exam: tonsillar erythema (Mild), tonsillomegaly (Mild), tonsillar exudate

(Minimal).  negative: R peritonsillar mass, L peritonsillar mass


Neck exam: Present: normal inspection, full ROM, lymphadenopathy.  Absent: 

tenderness, meningismus


Respiratory exam: Present: normal lung sounds bilaterally.  Absent: respiratory 

distress, wheezes, rales, rhonchi, stridor, chest wall tenderness, accessory 

muscle use


Cardiovascular Exam: Present: regular rate (84 by my auscultation and radial 

pulse), normal rhythm, normal heart sounds.  Absent: systolic murmur, diastolic 

murmur, rubs, gallop, clicks


GI/Abdominal exam: Present: soft, normal bowel sounds.  Absent: distended, 

tenderness, guarding, rebound, rigid


Extremities exam: Present: normal inspection, full ROM, normal capillary refill.

 Absent: tenderness, pedal edema, joint swelling, calf tenderness


Back exam: Present: normal inspection


Neurological exam: Present: alert, oriented X3, CN II-XII intact


Psychiatric exam: Present: normal affect, normal mood


Skin exam: Present: warm, dry, intact, normal color.  Absent: rash





Course


                                   Vital Signs











  10/16/22





  16:11


 


Temperature 98.3 F


 


Pulse Rate 108 H


 


Respiratory 20





Rate 


 


Blood Pressure 127/73


 


O2 Sat by Pulse 98





Oximetry 














Medical Decision Making





- Medical Decision Making





Patient presents with pharyngitis, bilateral ear pain likely related to 

inadequate eustachian tube function.  COVID-19 testing a streptococcal testing 

ordered.  Patient in no significant distress.





Patient tested negative for COVID-19 and Streptococcus.  Discussed the 

possibility of mononucleosis.  Discussed conservative therapy.





Patient with her regular physician.  I think the patient is still having 

symptoms after 10 days she may consider mononucleosis test.  Cautions discussed.





Patient was told to return to the ER for any signs or symptoms worsen.  Told to 

return immediately if any other problems arise.  All questions answered.  

Treatment plan discussed.  Patient in agreement


Every effort has been made to ensure accuracy of this dictation.  However, due 

to the limitations of electronic medical records and dictation devices, errors 

in charting still occur.








Supervisor Dr. Novoa





- Lab Data


                                   Lab Results











  10/16/22 10/16/22 Range/Units





  16:32 17:01 


 


Coronavirus (PCR)  Not Detected   (Not Detectd)  


 


Group A Strep (PCR)   NOT DETECTED  (Not Detectd)  














Disposition


Clinical Impression: 


 Acute viral pharyngitis





Disposition: HOME SELF-CARE


Condition: Good


Instructions (If sedation given, give patient instructions):  Pharyngitis (ED)


Additional Instructions: 


Use over-the-counter acetaminophen and/or ibuprofen for general discomfort.  

Follow-up with your regular physician as directed.  Return to the ER immediately

if any symptoms worsen, new symptoms arise, or any other problems develop.  If 

you still have symptoms after 10 days, consider a test for mononucleosis.


Is patient prescribed a controlled substance at d/c from ED?: No


Referrals: 


Vivek Mccormick MD [Primary Care Provider] - 10/21/22


Time of Disposition: 18:02

## 2022-12-12 ENCOUNTER — HOSPITAL ENCOUNTER (EMERGENCY)
Dept: HOSPITAL 47 - EC | Age: 27
Discharge: HOME | End: 2022-12-12
Payer: COMMERCIAL

## 2022-12-12 VITALS
HEART RATE: 83 BPM | SYSTOLIC BLOOD PRESSURE: 124 MMHG | TEMPERATURE: 97.6 F | DIASTOLIC BLOOD PRESSURE: 75 MMHG | RESPIRATION RATE: 16 BRPM

## 2022-12-12 DIAGNOSIS — Z88.8: ICD-10-CM

## 2022-12-12 DIAGNOSIS — Z20.822: ICD-10-CM

## 2022-12-12 DIAGNOSIS — J10.1: Primary | ICD-10-CM

## 2022-12-12 DIAGNOSIS — F17.200: ICD-10-CM

## 2022-12-12 PROCEDURE — 99283 EMERGENCY DEPT VISIT LOW MDM: CPT

## 2022-12-12 PROCEDURE — 87636 SARSCOV2 & INF A&B AMP PRB: CPT

## 2022-12-12 NOTE — ED
General Adult HPI





- General


Chief complaint: Upper Respiratory Infection


Stated complaint: URI


Time Seen by Provider: 22 19:38


Source: patient, RN notes reviewed


Mode of arrival: ambulatory


Limitations: no limitations





- History of Present Illness


Initial comments: 





20-year-old female presents to the emergency Department with complaints of nasal

congestion, cough, and sore throat, onset last night.  Patient states her son 

has been sick recently, and also had close contact with a family member who 

tested positive for influenza.  Patient states she does have a dry nonproductive

cough.  Has been taking anything to treat the symptoms.  Reports no aggravating 

or alleviating factors.  States congestion and cough interfered with her sleep 

last night.  Denies fever, chills, shortness of breath, difficulty breathing, 

abdominal pain, nausea, vomiting, diarrhea, or dysuria.





- Related Data


                                  Previous Rx's











 Medication  Instructions  Recorded


 


Oseltamivir Phosphate 75 mg PO BID 5 Days #10 capsule 22











                                    Allergies











Allergy/AdvReac Type Severity Reaction Status Date / Time


 


risperidone AdvReac  Leg Verified 22 17:40





   numbness  














Review of Systems


ROS Statement: 


Those systems with pertinent positive or pertinent negative responses have been 

documented in the HPI.





ROS Other: All systems not noted in ROS Statement are negative.





Past Medical History


Past Medical History: No Reported History


Additional Past Medical History / Comment(s): migraines


History of Any Multi-Drug Resistant Organisms: None Reported


Past Surgical History:  Section


Additional Past Surgical History / Comment(s): oral


Past Anesthesia/Blood Transfusion Reactions: No Reported Reaction


Past Psychological History: Anxiety, Bipolar


Smoking Status: Current every day smoker


Past Alcohol Use History: Rare


Past Drug Use History: None Reported





- Past Family History


  ** Mother


Family Medical History: Congestive Heart Failure (CHF), CVA/TIA, Myocardial 

Infarction (MI)





  ** Father


Family Medical History: Hypertension





General Exam


Limitations: no limitations (Well-developed, well-nourished female in no acute 

distress.)


General appearance: alert, in no apparent distress


ENT exam: Present: normal exam, normal oropharynx, mucous membranes moist, TM's 

normal bilaterally


Neck exam: Present: full ROM, lymphadenopathy


Respiratory exam: Present: normal lung sounds bilaterally.  Absent: respiratory 

distress, wheezes, rales, rhonchi, stridor, chest wall tenderness


Cardiovascular Exam: Present: regular rate, normal rhythm, normal heart sounds. 

Absent: systolic murmur, diastolic murmur, rubs, gallop, clicks


GI/Abdominal exam: Present: soft, normal bowel sounds.  Absent: distended, te

nderness, guarding, rebound, rigid


Neurological exam: Present: alert, oriented X3, CN II-XII intact


Psychiatric exam: Present: normal affect, normal mood


Skin exam: Present: warm, dry, intact, normal color.  Absent: rash





Course


                                   Vital Signs











  22





  17:39


 


Temperature 97.9 F


 


Pulse Rate 107 H


 


Respiratory 18





Rate 


 


Blood Pressure 118/80


 


O2 Sat by Pulse 98





Oximetry 














Medical Decision Making





- Medical Decision Making





This is a 27-year-old female who presents to the emergency department for 

evaluation of upper respiratory infection symptoms.  Upon exam, patient is well-

appearing and in no acute distress.  Physical exam findings are unremarkable. 

Cepheid is positive for influenza A.  She is given a dose of Tamiflu and Afrin 

while present in the emergency department.  She will be discharged home with 

instructions to follow-up with her PCP for recheck.  Discussed symptomatic 

management.  Patient verbalizes understanding and agrees with this plan.  

Attending: Oscar





- Lab Data


                                   Lab Results











  22 Range/Units





  17:47 


 


Influenza Type A (PCR)  Detected A  (Not Detectd)  


 


Influenza Type B (PCR)  Not Detected  (Not Detectd)  


 


RSV (PCR)  Not Detected  (Not Detectd)  


 


SARS-CoV-2 (PCR)  Not Detected  (Not Detectd)  














Disposition


Clinical Impression: 


 Influenza A





Disposition: HOME SELF-CARE


Condition: Stable


Instructions (If sedation given, give patient instructions):  Influenza (ED)


Additional Instructions: 


Treat fever and body aches by alternating Tylenol and Motrin.


Consider a vaporizer humidifier in the room and when she sleeps.


Take Tamiflu with food if it makes you queasy.


Use Afrin nasal spray for congestion.


Follow-up with your PCP for a recheck in 48 hours if needed.


Return to the emergency department with any new, worsening, or concerning 

symptoms.











Prescriptions: 


Oseltamivir Phosphate 75 mg PO BID 5 Days #10 capsule


Is patient prescribed a controlled substance at d/c from ED?: No


Referrals: 


Vivek Mccormick MD [Primary Care Provider] - 1-2 days


Time of Disposition: 20:05

## 2022-12-20 ENCOUNTER — HOSPITAL ENCOUNTER (EMERGENCY)
Dept: HOSPITAL 47 - EC | Age: 27
Discharge: HOME | End: 2022-12-20
Payer: COMMERCIAL

## 2022-12-20 VITALS — SYSTOLIC BLOOD PRESSURE: 126 MMHG | HEART RATE: 82 BPM | RESPIRATION RATE: 16 BRPM | DIASTOLIC BLOOD PRESSURE: 73 MMHG

## 2022-12-20 VITALS — TEMPERATURE: 98 F

## 2022-12-20 DIAGNOSIS — Z88.8: ICD-10-CM

## 2022-12-20 DIAGNOSIS — F41.9: ICD-10-CM

## 2022-12-20 DIAGNOSIS — J06.9: Primary | ICD-10-CM

## 2022-12-20 DIAGNOSIS — F17.200: ICD-10-CM

## 2022-12-20 DIAGNOSIS — K04.7: ICD-10-CM

## 2022-12-20 DIAGNOSIS — F31.9: ICD-10-CM

## 2022-12-20 DIAGNOSIS — Z20.822: ICD-10-CM

## 2022-12-20 PROCEDURE — 71046 X-RAY EXAM CHEST 2 VIEWS: CPT

## 2022-12-20 PROCEDURE — 99283 EMERGENCY DEPT VISIT LOW MDM: CPT

## 2022-12-20 PROCEDURE — 87635 SARS-COV-2 COVID-19 AMP PRB: CPT

## 2022-12-20 NOTE — XR
EXAMINATION TYPE: XR chest 2V

 

DATE OF EXAM: 12/20/2022 10:47 PM

 

COMPARISON: Chest radiographs from 8/26/2019

 

TECHNIQUE: XR chest 2V Frontal and lateral views of the chest.

 

CLINICAL INDICATION:Female, 27 years old with history of cough; 

 

FINDINGS: 

Lungs/Pleura: There is no evidence of pleural effusion, focal consolidation, or pneumothorax.  

Pulmonary vascularity: Unremarkable.

Heart/mediastinum: Cardiomediastinal silhouette is unremarkable.

Musculoskeletal: No acute osseous pathology.

 

IMPRESSION: 

No acute cardiopulmonary disease/process.

## 2022-12-20 NOTE — ED
URI HPI





- General


Chief Complaint: Upper Respiratory Infection


Stated Complaint: wants covid test


Time Seen by Provider: 22 22:03


Source: patient


Mode of arrival: ambulatory


Limitations: no limitations





- History of Present Illness


Initial Comments: 


Patient is a 27-year-old female presenting for Covid test.  Patient states that 

her mother recently had Covid Zaida Richards same house as the patient and her 

daughter.  Patient's daughter tested positive for Covid at home today but the 

patient tested negative.  She has a cough and congestion, however she states 

that she is also recovering from influenza.  The chest pain, difficulty 

breathing, fever, chills, nausea, vomiting, abdominal pain, sinus pain or 

pressure, sore throat, difficulty swallowing, ear pain.  Patient has also 

complaining of some left-sided lower jaw pain, she is a known dental caries to 

the area and is concerned for abscess.  No headache, vision or hearing changes, 

neck pain or stiffness.








- Related Data


                                  Previous Rx's











 Medication  Instructions  Recorded


 


Oseltamivir Phosphate 75 mg PO BID 5 Days #10 capsule 22


 


Amoxic-Pot Clav 875-125Mg 1 tab PO Q12HR 7 Days #14 tab 22





[Augmentin 875-125]  


 


Benzonatate [Tessalon Perles] 100 mg PO TID PRN #10 capsule 22











                                    Allergies











Allergy/AdvReac Type Severity Reaction Status Date / Time


 


risperidone AdvReac  Leg Verified 22 17:40





   numbness  














Review of Systems


ROS Statement: 


Those systems with pertinent positive or pertinent negative responses have been 

documented in the HPI.





ROS Other: All systems not noted in ROS Statement are negative.





Past Medical History


Past Medical History: No Reported History


Additional Past Medical History / Comment(s): migraines


History of Any Multi-Drug Resistant Organisms: None Reported


Past Surgical History:  Section


Additional Past Surgical History / Comment(s): oral


Past Anesthesia/Blood Transfusion Reactions: No Reported Reaction


Past Psychological History: Anxiety, Bipolar


Smoking Status: Current every day smoker


Past Alcohol Use History: Rare


Past Drug Use History: None Reported





- Past Family History


  ** Mother


Family Medical History: Congestive Heart Failure (CHF), CVA/TIA, Myocardial 

Infarction (MI)





  ** Father


Family Medical History: Hypertension





General Exam


Limitations: no limitations


General appearance: alert, in no apparent distress


Head exam: Present: atraumatic, normocephalic, normal inspection


Eye exam: Present: normal appearance, PERRL, EOMI.  Absent: scleral icterus, 

conjunctival injection, periorbital swelling


ENT exam: Present: normal exam, mucous membranes moist


  ** Expanded


Mouth exam: Present: tongue normal.  Absent: drooling, trismus, muffled voice


Teeth exam: Present: dental caries, gingival enlargement


Throat exam: normal inspection


Neck exam: Present: normal inspection, full ROM


Respiratory exam: Present: normal lung sounds bilaterally.  Absent: respiratory 

distress, wheezes, rales, rhonchi, stridor


Cardiovascular Exam: Present: regular rate, normal rhythm, normal heart sounds. 

Absent: systolic murmur, diastolic murmur, rubs, gallop, clicks


Neurological exam: Present: alert, oriented X3, CN II-XII intact


Psychiatric exam: Present: normal affect, normal mood


Skin exam: Present: warm, dry, intact, normal color.  Absent: rash





Course


                                   Vital Signs











  22





  21:59 23:28


 


Temperature 98 F 


 


Pulse Rate 100 82


 


Respiratory 20 16





Rate  


 


Blood Pressure 131/76 126/73


 


O2 Sat by Pulse 97 97





Oximetry  














Medical Decision Making





- Medical Decision Making


Patient is a 27-year-old female presenting for Covid test.  She is also 

complaining of some dental tenderness on the left lower side.  On physical 

examination her lungs are clear to auscultation, there is a large dental care he

noted into the left lower side with some gingival enlargement.  No brawny 

induration or trismus.  Patient is negative for Covid.  Chest x-ray shows no 

acute cardiopulmonary process.  Patient will be treated for dental abscess with 

Augmentin.  Since the patient's daughter is currently positive for Covid and her

mother who lives with her recently had Covid, instructed the patient to behave 

as though she is currently contagious.  Educated on quarantined guidelines and 

supportive treatment.  Follow-up with dentist.Follow-up with PCP.  Report back 

to ER with any new or worsening symptoms.  Discussed return parameters and 

answered all questions.  Patient conveyed verbal understanding and agreed to the

plan.  I discussed this case in detail with my attending Dr. Grier








- Lab Data


                                   Lab Results











  22 Range/Units





  22:13 


 


Coronavirus (PCR)  Not Detected  (Not Detectd)  














Disposition


Clinical Impression: 


 Upper respiratory tract infection, Dental abscess





Disposition: HOME SELF-CARE


Condition: Good


Instructions (If sedation given, give patient instructions):  Upper Respiratory 

Infection (ED)


Additional Instructions: 


Follow-up with PCP.  Report back to ER with any new or worsening symptoms.  Take

Motrin and Tylenol as needed.  Stay well-hydrated and get plenty of rest.


Prescriptions: 


Amoxic-Pot Clav 875-125Mg [Augmentin 875-125] 1 tab PO Q12HR 7 Days #14 tab


Benzonatate [Tessalon Perles] 100 mg PO TID PRN #10 capsule


 PRN Reason: Cough


Is patient prescribed a controlled substance at d/c from ED?: No


Referrals: 


Vivek Mccormick MD [Primary Care Provider] - 1-2 days


Time of Disposition: 23:12